# Patient Record
Sex: FEMALE | Race: OTHER | Employment: UNEMPLOYED | URBAN - METROPOLITAN AREA
[De-identification: names, ages, dates, MRNs, and addresses within clinical notes are randomized per-mention and may not be internally consistent; named-entity substitution may affect disease eponyms.]

---

## 2022-12-02 PROCEDURE — 93010 ELECTROCARDIOGRAM REPORT: CPT | Performed by: EMERGENCY MEDICINE

## 2022-12-02 PROCEDURE — 93005 ELECTROCARDIOGRAM TRACING: CPT

## 2022-12-02 PROCEDURE — 99285 EMERGENCY DEPT VISIT HI MDM: CPT

## 2022-12-02 PROCEDURE — 99284 EMERGENCY DEPT VISIT MOD MDM: CPT

## 2022-12-02 PROCEDURE — 36415 COLL VENOUS BLD VENIPUNCTURE: CPT

## 2022-12-02 RX ORDER — ERGOCALCIFEROL 1.25 MG/1
CAPSULE ORAL
COMMUNITY

## 2022-12-03 ENCOUNTER — HOSPITAL ENCOUNTER (EMERGENCY)
Facility: HOSPITAL | Age: 37
Discharge: HOME | End: 2022-12-03
Attending: EMERGENCY MEDICINE
Payer: MEDICAID

## 2022-12-03 ENCOUNTER — APPOINTMENT (OUTPATIENT)
Dept: GENERAL RADIOLOGY | Facility: HOSPITAL | Age: 37
End: 2022-12-03
Attending: EMERGENCY MEDICINE
Payer: MEDICAID

## 2022-12-03 VITALS
TEMPERATURE: 100 F | HEART RATE: 84 BPM | WEIGHT: 228 LBS | OXYGEN SATURATION: 100 % | BODY MASS INDEX: 40.4 KG/M2 | SYSTOLIC BLOOD PRESSURE: 132 MMHG | RESPIRATION RATE: 18 BRPM | HEIGHT: 63 IN | DIASTOLIC BLOOD PRESSURE: 70 MMHG

## 2022-12-03 DIAGNOSIS — J11.1 INFLUENZA: Primary | ICD-10-CM

## 2022-12-03 LAB
ANION GAP SERPL CALC-SCNC: 5 MMOL/L (ref 0–18)
ATRIAL RATE: 82 BPM
ATRIAL RATE: 86 BPM
B-HCG UR QL: NEGATIVE
BASOPHILS # BLD AUTO: 0.03 X10(3) UL (ref 0–0.2)
BASOPHILS NFR BLD AUTO: 0.3 %
BUN BLD-MCNC: 14 MG/DL (ref 7–18)
BUN/CREAT SERPL: 20 (ref 10–20)
CALCIUM BLD-MCNC: 9.2 MG/DL (ref 8.5–10.1)
CHLORIDE SERPL-SCNC: 107 MMOL/L (ref 98–112)
CO2 SERPL-SCNC: 26 MMOL/L (ref 21–32)
CREAT BLD-MCNC: 0.7 MG/DL
DEPRECATED RDW RBC AUTO: 49.3 FL (ref 35.1–46.3)
EOSINOPHIL # BLD AUTO: 0.23 X10(3) UL (ref 0–0.7)
EOSINOPHIL NFR BLD AUTO: 2.5 %
ERYTHROCYTE [DISTWIDTH] IN BLOOD BY AUTOMATED COUNT: 15.8 % (ref 11–15)
FLUAV + FLUBV RNA SPEC NAA+PROBE: NEGATIVE
FLUAV + FLUBV RNA SPEC NAA+PROBE: POSITIVE
GFR SERPLBLD BASED ON 1.73 SQ M-ARVRAT: 113 ML/MIN/1.73M2 (ref 60–?)
GLUCOSE BLD-MCNC: 100 MG/DL (ref 70–99)
HCT VFR BLD AUTO: 37.6 %
HGB BLD-MCNC: 11.6 G/DL
IMM GRANULOCYTES # BLD AUTO: 0.04 X10(3) UL (ref 0–1)
IMM GRANULOCYTES NFR BLD: 0.4 %
LYMPHOCYTES # BLD AUTO: 2.23 X10(3) UL (ref 1–4)
LYMPHOCYTES NFR BLD AUTO: 24.6 %
MCH RBC QN AUTO: 26.5 PG (ref 26–34)
MCHC RBC AUTO-ENTMCNC: 30.9 G/DL (ref 31–37)
MCV RBC AUTO: 85.8 FL
MONOCYTES # BLD AUTO: 1.13 X10(3) UL (ref 0.1–1)
MONOCYTES NFR BLD AUTO: 12.4 %
NEUTROPHILS # BLD AUTO: 5.42 X10 (3) UL (ref 1.5–7.7)
NEUTROPHILS # BLD AUTO: 5.42 X10(3) UL (ref 1.5–7.7)
NEUTROPHILS NFR BLD AUTO: 59.8 %
OSMOLALITY SERPL CALC.SUM OF ELEC: 287 MOSM/KG (ref 275–295)
P AXIS: 5 DEGREES
P AXIS: 51 DEGREES
P-R INTERVAL: 120 MS
P-R INTERVAL: 138 MS
PLATELET # BLD AUTO: 319 10(3)UL (ref 150–450)
POTASSIUM SERPL-SCNC: 4.1 MMOL/L (ref 3.5–5.1)
Q-T INTERVAL: 362 MS
Q-T INTERVAL: 372 MS
QRS DURATION: 78 MS
QRS DURATION: 80 MS
QTC CALCULATION (BEZET): 433 MS
QTC CALCULATION (BEZET): 434 MS
R AXIS: 21 DEGREES
R AXIS: 35 DEGREES
RBC # BLD AUTO: 4.38 X10(6)UL
RSV RNA SPEC NAA+PROBE: NEGATIVE
SARS-COV-2 RNA RESP QL NAA+PROBE: NOT DETECTED
SODIUM SERPL-SCNC: 138 MMOL/L (ref 136–145)
T AXIS: 35 DEGREES
T AXIS: 46 DEGREES
TROPONIN I HIGH SENSITIVITY: 4 NG/L
VENTRICULAR RATE: 82 BPM
VENTRICULAR RATE: 86 BPM
WBC # BLD AUTO: 9.1 X10(3) UL (ref 4–11)

## 2022-12-03 PROCEDURE — 93010 ELECTROCARDIOGRAM REPORT: CPT | Performed by: EMERGENCY MEDICINE

## 2022-12-03 PROCEDURE — 84484 ASSAY OF TROPONIN QUANT: CPT | Performed by: EMERGENCY MEDICINE

## 2022-12-03 PROCEDURE — 71045 X-RAY EXAM CHEST 1 VIEW: CPT | Performed by: EMERGENCY MEDICINE

## 2022-12-03 PROCEDURE — 81025 URINE PREGNANCY TEST: CPT

## 2022-12-03 PROCEDURE — 80048 BASIC METABOLIC PNL TOTAL CA: CPT | Performed by: EMERGENCY MEDICINE

## 2022-12-03 PROCEDURE — 85025 COMPLETE CBC W/AUTO DIFF WBC: CPT | Performed by: EMERGENCY MEDICINE

## 2022-12-03 PROCEDURE — 93005 ELECTROCARDIOGRAM TRACING: CPT

## 2022-12-03 PROCEDURE — 0241U SARS-COV-2/FLU A AND B/RSV BY PCR (GENEXPERT): CPT | Performed by: EMERGENCY MEDICINE

## 2022-12-03 RX ORDER — IBUPROFEN 600 MG/1
600 TABLET ORAL ONCE
Status: COMPLETED | OUTPATIENT
Start: 2022-12-03 | End: 2022-12-03

## 2022-12-03 NOTE — DISCHARGE INSTRUCTIONS
As discussed, you have a viral illness. Unfortunately there are no specific medications we can give you to make the illness and faster. Antibiotics do not work for viral illnesses. However you can take acetaminophen or ibuprofen to help with fevers and pain. Stay well-hydrated and rested. Return to the emergency department if your fevers and chills continue to worsen after 5 days, if you develop worsening cough with thick sputum, or unable to stay well-hydrated. Contact your primary care provider in the next few days for reevaluation and to make sure your symptoms are improving.

## 2022-12-03 NOTE — ED INITIAL ASSESSMENT (HPI)
Pt c/o fever, muscles aches x 2 days. Pt took advil last night. Chest pain and neck pain and cough and headache. Pt has hx of stroke.

## 2023-03-14 ENCOUNTER — OFFICE VISIT (OUTPATIENT)
Dept: OBGYN CLINIC | Facility: CLINIC | Age: 38
End: 2023-03-14
Payer: MEDICAID

## 2023-03-14 VITALS
SYSTOLIC BLOOD PRESSURE: 130 MMHG | DIASTOLIC BLOOD PRESSURE: 84 MMHG | HEIGHT: 63 IN | BODY MASS INDEX: 41.29 KG/M2 | WEIGHT: 233 LBS

## 2023-03-14 DIAGNOSIS — N39.3 URINARY, INCONTINENCE, STRESS FEMALE: ICD-10-CM

## 2023-03-14 DIAGNOSIS — Z01.419 ENCOUNTER FOR GYNECOLOGICAL EXAMINATION WITHOUT ABNORMAL FINDING: Primary | ICD-10-CM

## 2023-03-14 DIAGNOSIS — Z12.31 ENCOUNTER FOR SCREENING MAMMOGRAM FOR MALIGNANT NEOPLASM OF BREAST: ICD-10-CM

## 2023-03-14 PROCEDURE — 3079F DIAST BP 80-89 MM HG: CPT | Performed by: OBSTETRICS & GYNECOLOGY

## 2023-03-14 PROCEDURE — 99386 PREV VISIT NEW AGE 40-64: CPT | Performed by: OBSTETRICS & GYNECOLOGY

## 2023-03-14 PROCEDURE — 87624 HPV HI-RISK TYP POOLED RSLT: CPT | Performed by: OBSTETRICS & GYNECOLOGY

## 2023-03-14 PROCEDURE — 3075F SYST BP GE 130 - 139MM HG: CPT | Performed by: OBSTETRICS & GYNECOLOGY

## 2023-03-14 PROCEDURE — 3008F BODY MASS INDEX DOCD: CPT | Performed by: OBSTETRICS & GYNECOLOGY

## 2023-03-14 RX ORDER — DEXTROAMPHETAMINE SACCHARATE, AMPHETAMINE ASPARTATE, DEXTROAMPHETAMINE SULFATE AND AMPHETAMINE SULFATE 5; 5; 5; 5 MG/1; MG/1; MG/1; MG/1
1 TABLET ORAL EVERY MORNING
COMMUNITY
Start: 2023-02-21

## 2023-03-14 RX ORDER — AMOXICILLIN 500 MG
CAPSULE ORAL
COMMUNITY

## 2023-03-14 RX ORDER — MULTIVITAMIN WITH IRON
250 TABLET ORAL
COMMUNITY

## 2023-03-15 LAB — HPV I/H RISK 1 DNA SPEC QL NAA+PROBE: NEGATIVE

## 2023-03-21 ENCOUNTER — HOSPITAL ENCOUNTER (OUTPATIENT)
Dept: MAMMOGRAPHY | Facility: HOSPITAL | Age: 38
Discharge: HOME | End: 2023-03-21
Attending: OBSTETRICS & GYNECOLOGY
Payer: MEDICAID

## 2023-03-21 ENCOUNTER — OFFICE VISIT (OUTPATIENT)
Dept: INTERNAL MEDICINE CLINIC | Facility: CLINIC | Age: 38
End: 2023-03-21

## 2023-03-21 VITALS
RESPIRATION RATE: 18 BRPM | DIASTOLIC BLOOD PRESSURE: 71 MMHG | BODY MASS INDEX: 41 KG/M2 | HEART RATE: 79 BPM | WEIGHT: 234 LBS | SYSTOLIC BLOOD PRESSURE: 132 MMHG

## 2023-03-21 DIAGNOSIS — G43.009 MIGRAINE WITHOUT AURA AND WITHOUT STATUS MIGRAINOSUS, NOT INTRACTABLE: Primary | ICD-10-CM

## 2023-03-21 DIAGNOSIS — F98.8 ATTENTION DEFICIT DISORDER, UNSPECIFIED HYPERACTIVITY PRESENCE: ICD-10-CM

## 2023-03-21 DIAGNOSIS — H53.9 VISION CHANGES: ICD-10-CM

## 2023-03-21 DIAGNOSIS — M72.2 PLANTAR FASCIITIS: ICD-10-CM

## 2023-03-21 DIAGNOSIS — Z12.31 ENCOUNTER FOR SCREENING MAMMOGRAM FOR MALIGNANT NEOPLASM OF BREAST: ICD-10-CM

## 2023-03-21 PROCEDURE — 77067 SCR MAMMO BI INCL CAD: CPT | Performed by: OBSTETRICS & GYNECOLOGY

## 2023-03-21 PROCEDURE — 3075F SYST BP GE 130 - 139MM HG: CPT | Performed by: INTERNAL MEDICINE

## 2023-03-21 PROCEDURE — 77063 BREAST TOMOSYNTHESIS BI: CPT | Performed by: OBSTETRICS & GYNECOLOGY

## 2023-03-21 PROCEDURE — 3078F DIAST BP <80 MM HG: CPT | Performed by: INTERNAL MEDICINE

## 2023-03-21 PROCEDURE — 99214 OFFICE O/P EST MOD 30 MIN: CPT | Performed by: INTERNAL MEDICINE

## 2023-03-21 RX ORDER — SUMATRIPTAN 50 MG/1
50 TABLET, FILM COATED ORAL EVERY 2 HOUR PRN
Qty: 9 TABLET | Refills: 1 | Status: SHIPPED | OUTPATIENT
Start: 2023-03-21

## 2023-05-01 ENCOUNTER — OFFICE VISIT (OUTPATIENT)
Dept: PODIATRY CLINIC | Facility: CLINIC | Age: 38
End: 2023-05-01

## 2023-05-01 ENCOUNTER — HOSPITAL ENCOUNTER (OUTPATIENT)
Dept: GENERAL RADIOLOGY | Facility: HOSPITAL | Age: 38
Discharge: HOME | End: 2023-05-01
Attending: PODIATRIST
Payer: MEDICAID

## 2023-05-01 DIAGNOSIS — M72.2 PLANTAR FASCIITIS OF LEFT FOOT: ICD-10-CM

## 2023-05-01 DIAGNOSIS — M21.6X2 ACQUIRED EQUINUS DEFORMITY OF LEFT FOOT: ICD-10-CM

## 2023-05-01 DIAGNOSIS — S93.402S MODERATE LEFT ANKLE SPRAIN, SEQUELA: ICD-10-CM

## 2023-05-01 DIAGNOSIS — S93.402S MODERATE LEFT ANKLE SPRAIN, SEQUELA: Primary | ICD-10-CM

## 2023-05-01 DIAGNOSIS — M25.572 CHRONIC PAIN OF LEFT ANKLE: ICD-10-CM

## 2023-05-01 DIAGNOSIS — G89.29 CHRONIC PAIN OF LEFT ANKLE: ICD-10-CM

## 2023-05-01 PROCEDURE — 73610 X-RAY EXAM OF ANKLE: CPT | Performed by: PODIATRIST

## 2023-05-01 RX ORDER — METHYLPREDNISOLONE 4 MG/1
TABLET ORAL
Qty: 1 EACH | Refills: 0 | Status: SHIPPED | OUTPATIENT
Start: 2023-05-01

## 2023-05-25 ENCOUNTER — OFFICE VISIT (OUTPATIENT)
Dept: NEUROLOGY | Facility: CLINIC | Age: 38
End: 2023-05-25
Payer: MEDICAID

## 2023-05-25 VITALS — BODY MASS INDEX: 41.46 KG/M2 | WEIGHT: 234 LBS | HEIGHT: 63 IN

## 2023-05-25 DIAGNOSIS — H93.13 TINNITUS OF BOTH EARS: ICD-10-CM

## 2023-05-25 DIAGNOSIS — G43.009 MIGRAINE WITHOUT AURA AND WITHOUT STATUS MIGRAINOSUS, NOT INTRACTABLE: Primary | ICD-10-CM

## 2023-05-25 DIAGNOSIS — E23.6 EMPTY SELLA (HCC): ICD-10-CM

## 2023-05-25 PROCEDURE — 99204 OFFICE O/P NEW MOD 45 MIN: CPT | Performed by: OTHER

## 2023-05-25 PROCEDURE — 3008F BODY MASS INDEX DOCD: CPT | Performed by: OTHER

## 2023-05-25 RX ORDER — RIZATRIPTAN BENZOATE 10 MG/1
TABLET, ORALLY DISINTEGRATING ORAL
Qty: 12 TABLET | Refills: 0 | Status: SHIPPED | OUTPATIENT
Start: 2023-05-25

## 2023-05-25 RX ORDER — TOPIRAMATE 25 MG/1
TABLET ORAL
Qty: 90 TABLET | Refills: 5 | Status: SHIPPED | OUTPATIENT
Start: 2023-05-25

## 2023-09-27 ENCOUNTER — APPOINTMENT (OUTPATIENT)
Dept: MRI IMAGING | Facility: HOSPITAL | Age: 38
End: 2023-09-27
Attending: NURSE PRACTITIONER
Payer: MEDICAID

## 2023-09-27 ENCOUNTER — HOSPITAL ENCOUNTER (EMERGENCY)
Facility: HOSPITAL | Age: 38
Discharge: HOME | End: 2023-09-27
Payer: MEDICAID

## 2023-09-27 VITALS
OXYGEN SATURATION: 99 % | HEIGHT: 63 IN | WEIGHT: 233 LBS | TEMPERATURE: 99 F | DIASTOLIC BLOOD PRESSURE: 55 MMHG | HEART RATE: 67 BPM | BODY MASS INDEX: 41.29 KG/M2 | RESPIRATION RATE: 18 BRPM | SYSTOLIC BLOOD PRESSURE: 115 MMHG

## 2023-09-27 DIAGNOSIS — M54.16 LUMBAR RADICULOPATHY: ICD-10-CM

## 2023-09-27 DIAGNOSIS — M51.26 LUMBAR HERNIATED DISC: Primary | ICD-10-CM

## 2023-09-27 PROCEDURE — 99284 EMERGENCY DEPT VISIT MOD MDM: CPT

## 2023-09-27 PROCEDURE — 99285 EMERGENCY DEPT VISIT HI MDM: CPT

## 2023-09-27 PROCEDURE — 72148 MRI LUMBAR SPINE W/O DYE: CPT | Performed by: NURSE PRACTITIONER

## 2023-09-27 PROCEDURE — 96372 THER/PROPH/DIAG INJ SC/IM: CPT

## 2023-09-27 RX ORDER — METHYLPREDNISOLONE 4 MG/1
TABLET ORAL
Qty: 1 EACH | Refills: 0 | Status: SHIPPED | OUTPATIENT
Start: 2023-09-27

## 2023-09-27 RX ORDER — CYCLOBENZAPRINE HCL 10 MG
10 TABLET ORAL 3 TIMES DAILY PRN
Qty: 20 TABLET | Refills: 0 | Status: SHIPPED | OUTPATIENT
Start: 2023-09-27 | End: 2023-10-04

## 2023-09-27 RX ORDER — KETOROLAC TROMETHAMINE 15 MG/ML
30 INJECTION, SOLUTION INTRAMUSCULAR; INTRAVENOUS ONCE
Status: COMPLETED | OUTPATIENT
Start: 2023-09-27 | End: 2023-09-27

## 2023-09-27 RX ORDER — CYCLOBENZAPRINE HCL 5 MG
10 TABLET ORAL ONCE
Status: COMPLETED | OUTPATIENT
Start: 2023-09-27 | End: 2023-09-27

## 2023-09-27 RX ORDER — LIDOCAINE 50 MG/G
1 PATCH TOPICAL EVERY 24 HOURS
Qty: 5 EACH | Refills: 0 | Status: SHIPPED | OUTPATIENT
Start: 2023-09-27

## 2023-09-27 NOTE — ED INITIAL ASSESSMENT (HPI)
33 y/o female arrives with back pain x 4 days. Her pcp referred her to the ED. Pt reports tingling to bilateral LE, and x 1 episode of urinary incontinence today. Pt denies LE weakness.

## 2023-09-27 NOTE — ED QUICK NOTES
Patient reminded of need for urine specimen. Patient verbalizes understanding and denies need to urinate at this time.

## 2023-09-28 NOTE — ED QUICK NOTES
Per provider patient ok to discharge. Discharge instructions/medications reviewed with patient. Patient verbalizes understanding. Patient in NAD, ABCs intact. Patient stable and ambulatory at discharge. Patient left department with all belongings.

## 2023-09-28 NOTE — DISCHARGE INSTRUCTIONS
Tylenol/ibuprofen as needed for pain  Ice to your sore areas   Do not drink or drive if taking muscle relaxer  Follow-up with your primary care doctor in 2-3 days  Make appointment with the specialist  Return to the emergency department for any new or worsening symptoms at any time

## 2023-10-02 ENCOUNTER — OFFICE VISIT (OUTPATIENT)
Dept: SURGERY | Facility: CLINIC | Age: 38
End: 2023-10-02
Payer: MEDICAID

## 2023-10-02 VITALS
BODY MASS INDEX: 41.29 KG/M2 | HEART RATE: 73 BPM | DIASTOLIC BLOOD PRESSURE: 60 MMHG | HEIGHT: 63 IN | WEIGHT: 233 LBS | SYSTOLIC BLOOD PRESSURE: 110 MMHG | OXYGEN SATURATION: 97 %

## 2023-10-02 DIAGNOSIS — G93.5 CHIARI MALFORMATION TYPE I (HCC): ICD-10-CM

## 2023-10-02 DIAGNOSIS — R68.89 COLD INTOLERANCE: ICD-10-CM

## 2023-10-02 DIAGNOSIS — J34.89 NASAL DRAINAGE: ICD-10-CM

## 2023-10-02 DIAGNOSIS — L60.3 BRITTLE NAILS: ICD-10-CM

## 2023-10-02 DIAGNOSIS — M51.36 ANNULAR TEAR OF LUMBAR DISC: ICD-10-CM

## 2023-10-02 DIAGNOSIS — M54.50 LUMBAR PAIN: Primary | ICD-10-CM

## 2023-10-02 DIAGNOSIS — L65.9 HAIR LOSS: ICD-10-CM

## 2023-10-02 DIAGNOSIS — S39.012A STRAIN OF LUMBAR REGION, INITIAL ENCOUNTER: ICD-10-CM

## 2023-10-02 DIAGNOSIS — R51.9 FREQUENT HEADACHES: ICD-10-CM

## 2023-10-02 DIAGNOSIS — E66.3 PATIENT OVERWEIGHT: ICD-10-CM

## 2023-10-02 DIAGNOSIS — M54.2 NECK PAIN: ICD-10-CM

## 2023-10-02 PROBLEM — F98.8 ATTENTION DEFICIT DISORDER (ADD) IN ADULT: Status: ACTIVE | Noted: 2022-08-29

## 2023-10-02 PROBLEM — E66.01 SEVERE OBESITY (BMI >= 40) (HCC): Status: ACTIVE | Noted: 2023-08-17

## 2023-10-02 PROBLEM — G81.94 LEFT HEMIPARESIS (HCC): Status: ACTIVE | Noted: 2021-03-11

## 2023-10-02 PROBLEM — M54.41 CHRONIC BILATERAL LOW BACK PAIN WITH BILATERAL SCIATICA: Status: ACTIVE | Noted: 2021-12-08

## 2023-10-02 PROBLEM — E78.1 ABNORMALLY LOW HIGH DENSITY LIPOPROTEIN (HDL) CHOLESTEROL WITH HYPERTRIGLYCERIDEMIA: Status: ACTIVE | Noted: 2021-12-08

## 2023-10-02 PROBLEM — N39.3 STRESS INCONTINENCE: Status: ACTIVE | Noted: 2022-05-12

## 2023-10-02 PROBLEM — E78.6 ABNORMALLY LOW HIGH DENSITY LIPOPROTEIN (HDL) CHOLESTEROL WITH HYPERTRIGLYCERIDEMIA: Status: ACTIVE | Noted: 2021-12-08

## 2023-10-02 PROBLEM — K21.9 GASTROESOPHAGEAL REFLUX DISEASE WITHOUT ESOPHAGITIS: Status: ACTIVE | Noted: 2023-05-15

## 2023-10-02 PROBLEM — M54.42 CHRONIC BILATERAL LOW BACK PAIN WITH BILATERAL SCIATICA: Status: ACTIVE | Noted: 2021-12-08

## 2023-10-02 PROBLEM — G89.29 CHRONIC BILATERAL LOW BACK PAIN WITH BILATERAL SCIATICA: Status: ACTIVE | Noted: 2021-12-08

## 2023-10-02 PROCEDURE — 3078F DIAST BP <80 MM HG: CPT | Performed by: PHYSICIAN ASSISTANT

## 2023-10-02 PROCEDURE — 99204 OFFICE O/P NEW MOD 45 MIN: CPT | Performed by: PHYSICIAN ASSISTANT

## 2023-10-02 PROCEDURE — 3008F BODY MASS INDEX DOCD: CPT | Performed by: PHYSICIAN ASSISTANT

## 2023-10-02 PROCEDURE — 3074F SYST BP LT 130 MM HG: CPT | Performed by: PHYSICIAN ASSISTANT

## 2023-10-02 RX ORDER — GABAPENTIN 300 MG/1
300 CAPSULE ORAL EVERY EVENING
COMMUNITY
Start: 2023-05-03

## 2023-10-02 NOTE — PROGRESS NOTES
New patient:  Reason for visit: lower back pain     Estimated time of onset:  week(s)  9.24.23    Numeric Rating Scale: (No Pain) 0  to  10 (Worst Pain)        Pain at Present:  5/10                                                                                                                       Distribution of Pain:    bilateral N/T in MP arms, states she has tingling in MP legs     Past Treatments for Current Pain Condition:   NSAIDS, Gabapentin, Medrol pack helping her   Response to treatment: no relief    Prior diagnostic testing related to this condition:      MRI lumbar spine 9.27.23

## 2023-10-02 NOTE — H&P
Patient: Johnny Salazar  Medical Record Number: [id number]  YOB: 1985  PCP: Earlene Gordillo    Reason for visit: Patient presents with:  New Patient  Low Back Pain      Thank you very much for requesting this consultation. I had the opportunity to evaluate and initiate care for your patient today, as per your request.    HISTORY OF CHIEF COMPLAINT:    Johnny Salazar is a very pleasant 34year old female, with a pertinent PMH of hypertriglyceridemia, migraines, left hemiparesis, ADHD, GERD, chronic low back pain with bilateral sciatica, stress incontinence, obesity  Presents today for a complaint of: Patient presents with:  New Patient  Low Back Pain  The patient was recently at the ED on 9/27/2023 for back pain and tingling in the legs. She states she had incontinence secondary to the pain. MRI imaging was completed. The patient was provided on discharge a lidocaine patch, Medrol Dosepak and Flexeril. The patient is feeling significantly improved with medication therapy. She typically has 1-2 flares of back pain per year. Currently she has no radiating lower extremity pain, numbness, tingling or weakness. No urinary incontinence, although she is does have leakage. History of a sling placement. No recent pelvic floor therapy. Her back pain is much improved. Location is mid low back. This flare feels similar to flares prior. No recent conservative treatment such as physical therapy. The patient endorses she has been working on losing weight to assist with her back pain. She has difficulty with weight loss, even after cutting certain foods out of her diet. She notes hair loss and brittle nails. Cold intolerance. She is concerned she has a thyroid problem. Patient endorses a history of a Chiari malformation. She has seen ophthalmology prior given concerns with the Chiari. She endorses frequent headaches. She endorses auras. She does get neck pain and base of the skull pain.   Headaches occasionally worse with coughing and sneezing. She endorses clear nasal drainage during her headaches, worse with flexing forward. This resolves after her headaches. No gait instability. No shooting arm pain, numbness, tingling or weakness. Past Medical History:   Diagnosis Date    ADHD     Essential hypertension     Heart murmur     Hyperlipidemia     Spinal stenosis     TIA (transient ischemic attack)     Urinary incontinence       Past Surgical History:   Procedure Laterality Date    APPENDECTOMY            X2    CHOLECYSTECTOMY      EVALUATE ONLY, BLADDER (DMG)      MIDURETHRAL SLING  2022    TVT @ [facility]    OTHER SURGICAL HISTORY      Gallbladder removal & 2 hernia repairs    TUBAL LIGATION      Yr       Family History   Problem Relation Age of Onset    Hypertension Mother     Other (cervical cancer) Mother     Diabetes Father     Hypertension Father       Social History    Socioeconomic History      Marital status:     Occupational History      Occupation: [employer]    Tobacco Use      Smoking status: Never      Smokeless tobacco: Never    Vaping Use      Vaping Use: Never used    Substance and Sexual Activity      Alcohol use: Yes        Comment: Rarely      Drug use: Never      Sexual activity: Yes        Partners: Male        Birth control/protection: Tubal Ligation    Other Topics      Concerns:        Caffeine Concern: No        Exercise: No        Seat Belt: Yes        Special Diet: No        Stress Concern: Yes        Weight Concern: Yes        Blood Transfusions: No       Cephalexin              HIVES  Morphine                NAUSEA AND VOMITING, ANGIOEDEMA,                            Tightness in Chest    Comment:Very anxious cannot catch breath  Sumatriptan             OTHER (SEE COMMENTS)   Current Medications:  Current Outpatient Medications   Medication Sig Dispense Refill    gabapentin 300 MG Oral Cap Take 1 capsule (300 mg total) by mouth every evening.       lidocaine 5 % External Patch Place 1 patch onto the skin daily. 5 each 0    methylPREDNISolone (MEDROL) 4 MG Oral Tablet Therapy Pack Dosepack: take as directed 1 each 0    cyclobenzaprine 10 MG Oral Tab Take 1 tablet (10 mg total) by mouth 3 (three) times daily as needed for Muscle spasms. 20 tablet 0    topiramate 25 MG Oral Tab Start with 1 pill QHS, for 1 week, then 2 pills qhs foor another week, then 3 pills qhs 90 tablet 5    rizatriptan 10 MG Oral Tablet Dispersible use at onset; may repeat once after 2 hours- ONLY 2 IN 24 HOUR PERIOD MAX. This is a 30 day supply. 12 tablet 0    Cholecalciferol 50 MCG (2000 UT) Oral Cap Take 2,000 Units by mouth daily. magnesium 250 MG Oral Tab Take 1 tablet (250 mg total) by mouth. Omega-3 Fatty Acids (FISH OIL) 1200 MG Oral Cap Take by mouth. amphetamine-dextroamphetamine 20 MG Oral Tab Take 1 tablet by mouth every morning. REVIEW OF SYSTEMS   Comprehensive review of systems done. Negative except what is outlined in the above HPI. PHYSICAL EXAMIMATION    height is 63\" and weight is 233 lb (105.7 kg). Her blood pressure is 110/60 and her pulse is 73. Her oxygen saturation is 97%. GENERAL: Very pleasant patient is in no apparent distress. Sitting comfortably in the examination chair. HEENT: Normocephalic, atraumatic. RESPIRATORY RATE: Easy and Even  SKIN: Warm and dry  NEURO: Awake, alert and orientated. Speech fluent, comprehension intact, answering questions appropriately. SPINE:  Gait/Coordination: Gait deferred   Palpation: Non tender to palpation of midline cervical spine.  + tenderness over L4, L5 and S1    Upper Extremity Strength:    Deltoid  Biceps  Triceps     Intrinsics      Right 5 5 5 5 5     Left 5 5 5 5 5   Lower Extremity Strength:     Iliopsoas  Hamstrings   Quads    D-flexion P-flexion Great Toe   Right       5         5       5         5 5 5   Left       5         5       5         5 5 5     Tests:   Test Right   (POS or NEG) Left   (POS or NEG)   Alarcon's Sign Neg Neg   SLR Neg Neg   Clonus Neg Neg     DATA:   None    IMAGING:   Study Result    Narrative   PROCEDURE: MRI SPINE LUMBAR (CPT=72148)     COMPARISON: None. INDICATIONS: Radicular symptoms. TECHNIQUE: A variety of imaging planes and parameters were utilized for visualization of suspected pathology. FINDINGS:  PARASPINAL AREA: Increased fluid signal within the interspinous ligament at L4-L5. Anteverted uterus with  section scar and nabothian cysts in the cervix. BONES: Focal fatty infiltration versus hemangioma in the T12 and L1 vertebral bodies. Modic endplate reactive change above and below the L4-5 disc. Otherwise grossly normal vertebral body height, alignment, and marrow signal.  CORD/CAUDA EQUINA: Conus terminates at L1 pedicle level. The visualized cord, conus, and cauda equina show normal signal and morphology. OTHER: Negative. LUMBAR DISC LEVELS:  L1-L2: No significant disc/facet abnormality, spinal stenosis, or foraminal stenosis. L2-L3: No significant disc/facet abnormality, spinal stenosis, or foraminal stenosis. L3-L4: No significant disc/facet abnormality, spinal stenosis, or foraminal stenosis. L4-L5: Disc desiccation. Broad-based disc bulge extending into the lateral recesses. Linear high intensity zone in the inferior aspect the disc compatible with annular tear. This finding causes mild narrowing central canal and mild bilateral neural  foraminal narrowing. The disc bulge also potentially contacts the transitioning bilateral L5 nerve roots. L5-S1: No significant disc/facet abnormality, spinal stenosis, or foraminal stenosis. Impression   CONCLUSION:  1. Broad-based disc bulge at L4-5. Annular tear is also in this disc. At this level there is mild central canal and mild bilateral neural foraminal narrowing.   Disc bulge may also potentially contact the transitioning bilateral L5 nerve roots and  therefore correlate for bilateral L4 and L5 radiculopathies. 2.  Low-grade sprain of the interspinous ligament at L4-L5. Dictated by (CST): Romina De Leon MD on 9/27/2023 at 7:09 PM      Finalized by (CST): Romina De Leon MD on 9/27/2023 at 7:16 PM       MEDICAL DECISION MAKING:     ASSESSMENT and PLAN:  (M54.50) Lumbar pain  (primary encounter diagnosis)    (S39.012A) Strain of lumbar region, initial encounter    (E66.3) Patient overweight    (L60.3) Brittle nails    (L65.9) Hair loss    (R68.89) Cold intolerance    (M51.36) Annular tear of lumbar disc    (J34.89) Clear nasal drainage during headaches    (G93.5) Chiari malformation type I (HCC)    (M54.2) Neck pain    (R51.9) Frequent headaches    PLAN:   1. Medication: None prescribed   - Recommend OTC NSAID therapy PRN  2. Imaging:    - Reviewed today:    - MRI lumbar spine:     - Lumbar DDD most prominent at L4-5 with central disc bulge and annular tear with possible abutment of the bilateral L5 nerve roots. - Ordered today:    - MRI brain and cervical:     -Further assess history of Chiari I malformation with frequent headaches and clear nasal discharge during headaches as well as neck pain  3. Referral:    - Integrative medicine clinic:    -Referred for assessment of brittle nails, cold intolerance and hair loss   -Physiatry:    -Further conservative treatment of low back   -Weight loss clinic  4. Follow up with Dr. Destini Smalls following MRI brain and cervical imaging or call or follow up sooner or go to the ED for any new, worsening or concerning signs or symptoms     Dr. Jean-Paul Menjivar and I reviewed imaging and discussed the plan. Dr. Jean-Paul Menjivar agrees with the plan. Dr. Jean-Paul Menjivar and RICK reviewed imaging and discussed the plan with the patient. The patient agrees with the plan, verbalized understanding and is appreciative. All questions were sought out and thoroughly answered to satisfaction.        Total visit time: 50 min  More than 50% spent coordinating care, providing patient education, reviewing imaging, discussing further imaging, discussing weight loss clinic, discussing integrative medicine clinic, discussing physiatry and counseling. Thank you very much for the kind referral.  Respectfully yours,    Fadia Decker M.S., EILEEN CORTES [address] [place]  [place], [address] [facility]  [phone]  10/2/2023 11:01 AM    Dragon speech recognition software was used to prepare this note. If a word or phrase is confusing, it is likely due to a failure of recognition. Please contact me with any questions or clarifications.

## 2023-10-02 NOTE — PROGRESS NOTES
Neurosurgery staff    Patient seen and examined. Please also see SEAN Lopez note for further information. History reviewed in detail. MRI scan reviewed personally. Mild spondylosis without evidence of severe central stenosis. We spent some time discussing her headaches. These do appear to have a tussive quality. She also notes intermittent fluid drainage from her nose, when she has headaches. This most recently happened about 3 months ago. Obtain MRI scan of the brain and cervical spine to evaluate reported Chiari I malformation. Patient will follow-up with my partner, Dr. Navneet Hernandez, once this is complete.

## 2023-10-27 ENCOUNTER — TELEPHONE (OUTPATIENT)
Dept: SURGERY | Facility: CLINIC | Age: 38
End: 2023-10-27

## 2023-10-27 ENCOUNTER — PATIENT MESSAGE (OUTPATIENT)
Dept: SURGERY | Facility: CLINIC | Age: 38
End: 2023-10-27

## 2023-10-27 ENCOUNTER — HOSPITAL ENCOUNTER (OUTPATIENT)
Dept: MRI IMAGING | Age: 38
Discharge: HOME | End: 2023-10-27
Attending: PHYSICIAN ASSISTANT

## 2023-10-27 DIAGNOSIS — G93.5 CHIARI MALFORMATION TYPE I (HCC): ICD-10-CM

## 2023-10-27 DIAGNOSIS — J34.89 NASAL DRAINAGE: ICD-10-CM

## 2023-10-27 DIAGNOSIS — R51.9 FREQUENT HEADACHES: ICD-10-CM

## 2023-10-27 DIAGNOSIS — M54.2 NECK PAIN: ICD-10-CM

## 2023-10-27 PROCEDURE — 70551 MRI BRAIN STEM W/O DYE: CPT | Performed by: PHYSICIAN ASSISTANT

## 2023-10-27 NOTE — TELEPHONE ENCOUNTER
Pt calling on her way to 11am MRI at [facility] [address] Location and states is claustrophobic and will need medication. Psr informed pt that psr will request nurse to call to see if possible due to timing. Please call to advise as pt states will need to r/s if unable to get medication.

## 2023-10-27 NOTE — TELEPHONE ENCOUNTER
Called patient and left a message that we need more time to get a prescription ordered  for her. She is at the appt now. If she cannot complete the MRI she should reschedule it and call us back to get premedication. I left message that she would need a  if she is going to be premedicated. To call us if needs anything further.

## 2023-10-30 ENCOUNTER — PATIENT MESSAGE (OUTPATIENT)
Dept: OBGYN CLINIC | Facility: CLINIC | Age: 38
End: 2023-10-30

## 2023-10-30 NOTE — TELEPHONE ENCOUNTER
Noted that patient has messaged, requesting feedback on MRI. Patient completed MRI Brain on 10.27.23. MRI cervical has not been completed and no future office visit is scheduled. Per SEAN Maguire at office visit on 10. 2.23:  \"PLAN:   1. Medication: None prescribed                - Recommend OTC NSAID therapy PRN  2. Imaging:                 - Reviewed today:                              - MRI lumbar spine:                                            - Lumbar DDD most prominent at L4-5 with central disc bulge and annular tear with possible abutment of the bilateral L5 nerve roots. - Ordered today:                              - MRI brain and cervical:                                            -Further assess history of Chiari I malformation with frequent headaches and clear nasal discharge during headaches as well as neck pain  3. Referral:                 - Integrative medicine clinic:                              -Referred for assessment of brittle nails, cold intolerance and hair loss                -Physiatry:                              -Further conservative treatment of low back                -Weight loss clinic  4. Follow up with Dr. Amalia Lima following MRI brain and cervical imaging or call or follow up sooner or go to the ED for any new, worsening or concerning signs or symptoms\"    Routed to SEAN Maguire.

## 2023-10-30 NOTE — TELEPHONE ENCOUNTER
Patient has replied via Muut, asking for a call back to discuss imaging despite acknowledging that she will schedule an appointment to discuss findings with Dr. Savannah Clay. Called patient to inform her of above. Patient stated that she had scheduled an appointment with SEAN Coleman on 11. 1.23 to discuss imaging results. Recommended to patient that she schedule an appointment to see Dr. Savannah Clay next. Patient stated that Dr. Savannah Clay does not have appointments until December. Recommended to patient to see DIANNE Caldwell on a date when Dr. Savannah Clay is in clinic. Patient acknowledged and stated that she would like a Muut message summarizing above discussion and thanked Nursing for the call. Muut message sent.

## 2023-10-30 NOTE — TELEPHONE ENCOUNTER
From: Nereida Ho  To: Beatriz Patricia  Sent: 10/27/2023 2:10 PM CDT  Subject: MRI Results    Hello Doctor    I just want to know if you can please explain to me the results of my MRI test. Thank you!

## 2023-10-30 NOTE — TELEPHONE ENCOUNTER
Pt cb and has been scheduled for 11/16/23 with PA to discuss imaging results pt obtaining MRI Cervical 11/10/23

## 2023-10-30 NOTE — TELEPHONE ENCOUNTER
Yes, schedule with Dr. Shaun Saldana to review imaging after MRI cervical spine is also completed. Thank you!

## 2023-11-14 ENCOUNTER — TELEMEDICINE (OUTPATIENT)
Dept: INTERNAL MEDICINE CLINIC | Facility: CLINIC | Age: 38
End: 2023-11-14
Payer: MEDICAID

## 2023-11-14 DIAGNOSIS — R06.83 SNORING: ICD-10-CM

## 2023-11-14 DIAGNOSIS — M54.41 CHRONIC BILATERAL LOW BACK PAIN WITH BILATERAL SCIATICA: ICD-10-CM

## 2023-11-14 DIAGNOSIS — Z51.81 ENCOUNTER FOR THERAPEUTIC DRUG MONITORING: Primary | ICD-10-CM

## 2023-11-14 DIAGNOSIS — E88.819 INSULIN RESISTANCE: ICD-10-CM

## 2023-11-14 DIAGNOSIS — G43.009 MIGRAINE WITHOUT AURA AND WITHOUT STATUS MIGRAINOSUS, NOT INTRACTABLE: ICD-10-CM

## 2023-11-14 DIAGNOSIS — M54.42 CHRONIC BILATERAL LOW BACK PAIN WITH BILATERAL SCIATICA: ICD-10-CM

## 2023-11-14 DIAGNOSIS — R53.82 CHRONIC FATIGUE: ICD-10-CM

## 2023-11-14 DIAGNOSIS — Z86.73 HISTORY OF TIA (TRANSIENT ISCHEMIC ATTACK): ICD-10-CM

## 2023-11-14 DIAGNOSIS — G89.29 CHRONIC BILATERAL LOW BACK PAIN WITH BILATERAL SCIATICA: ICD-10-CM

## 2023-11-14 DIAGNOSIS — E66.01 CLASS 3 SEVERE OBESITY WITH SERIOUS COMORBIDITY AND BODY MASS INDEX (BMI) OF 40.0 TO 44.9 IN ADULT, UNSPECIFIED OBESITY TYPE (HCC): ICD-10-CM

## 2023-11-14 PROBLEM — E66.813 CLASS 3 SEVERE OBESITY WITH SERIOUS COMORBIDITY AND BODY MASS INDEX (BMI) OF 40.0 TO 44.9 IN ADULT (HCC): Status: ACTIVE | Noted: 2023-11-14

## 2023-11-14 PROCEDURE — 99204 OFFICE O/P NEW MOD 45 MIN: CPT | Performed by: NURSE PRACTITIONER

## 2023-11-14 RX ORDER — METFORMIN HYDROCHLORIDE 750 MG/1
1500 TABLET, EXTENDED RELEASE ORAL
Qty: 60 TABLET | Refills: 2 | Status: SHIPPED | OUTPATIENT
Start: 2023-11-14

## 2023-11-16 ENCOUNTER — TELEPHONE (OUTPATIENT)
Dept: SURGERY | Facility: CLINIC | Age: 38
End: 2023-11-16

## 2023-11-16 NOTE — TELEPHONE ENCOUNTER
Spoke to patient. She will reschedule with Dr Nakita Dominguez and needs to bring in imaging from [facility].

## 2023-11-27 ENCOUNTER — OFFICE VISIT (OUTPATIENT)
Dept: SURGERY | Facility: CLINIC | Age: 38
End: 2023-11-27
Payer: MEDICAID

## 2023-11-27 ENCOUNTER — TELEPHONE (OUTPATIENT)
Dept: SURGERY | Facility: CLINIC | Age: 38
End: 2023-11-27

## 2023-11-27 VITALS
BODY MASS INDEX: 41.29 KG/M2 | WEIGHT: 233 LBS | HEART RATE: 70 BPM | DIASTOLIC BLOOD PRESSURE: 82 MMHG | HEIGHT: 63 IN | SYSTOLIC BLOOD PRESSURE: 112 MMHG

## 2023-11-27 DIAGNOSIS — H93.A1 PULSATILE TINNITUS OF RIGHT EAR: Primary | ICD-10-CM

## 2023-11-27 DIAGNOSIS — J34.89 RHINORRHEA: ICD-10-CM

## 2023-11-27 DIAGNOSIS — R51.9 INTRACTABLE HEADACHE, UNSPECIFIED CHRONICITY PATTERN, UNSPECIFIED HEADACHE TYPE: ICD-10-CM

## 2023-11-27 PROCEDURE — 3008F BODY MASS INDEX DOCD: CPT | Performed by: NEUROLOGICAL SURGERY

## 2023-11-27 PROCEDURE — 3074F SYST BP LT 130 MM HG: CPT | Performed by: NEUROLOGICAL SURGERY

## 2023-11-27 PROCEDURE — 99205 OFFICE O/P NEW HI 60 MIN: CPT | Performed by: NEUROLOGICAL SURGERY

## 2023-11-27 PROCEDURE — 3079F DIAST BP 80-89 MM HG: CPT | Performed by: NEUROLOGICAL SURGERY

## 2023-11-27 NOTE — TELEPHONE ENCOUNTER
Patient brought in imaging disc of MRI cervical w/o contrast and MRI head wo contrast taken on 11-4-23. Uploaded to PACS. Disc returned to patient during appt.

## 2023-12-15 ENCOUNTER — TELEPHONE (OUTPATIENT)
Dept: SURGERY | Facility: CLINIC | Age: 38
End: 2023-12-15

## 2023-12-15 NOTE — TELEPHONE ENCOUNTER
Patient came to office stating Dr Destini Smalls gave her a sterile cup to collect nasal CSF leakage but she misplaced it. Per OV note  \"Plan:  - Follow up with Dr. Pilar Melgar for [address] workup and treatment  - Obtain CTA head and neck from [facility] for my review  - Referral placed to see Dr. Ashley Rios of ENT for further evaluation of muffled hearing and possible CSF leak  - I gave her a sterile collection cup to gather rhinorrhea for beta-2 transferrin testing - she will bring into the office when able  - Continue with weight management clinic  - Follow up after imaging is obtained and if possible after evaluation by DR. Salgado\"    Patient was provided with a new sterile collection cup labeled with name and .

## 2023-12-19 ENCOUNTER — TELEPHONE (OUTPATIENT)
Dept: SURGERY | Facility: CLINIC | Age: 38
End: 2023-12-19

## 2023-12-19 NOTE — TELEPHONE ENCOUNTER
Pt brought in Disc from [address] [place] [address]    MRI Cervical Dated 11/4/23  MRI Head W/O Dated 11/4/23  CT Head Stroke Code WO Dated 11/3/23  CT Head perfusion W/WO Dated 11/3/23    MRI Head W/WO 1/11/18  CT Head WO 1/13/18  CT Head WO 1/9/18    Will mail out disc.

## 2024-01-03 ENCOUNTER — OFFICE VISIT (OUTPATIENT)
Dept: OTOLARYNGOLOGY | Facility: CLINIC | Age: 39
End: 2024-01-03
Payer: MEDICAID

## 2024-01-03 ENCOUNTER — OFFICE VISIT (OUTPATIENT)
Dept: AUDIOLOGY | Facility: CLINIC | Age: 39
End: 2024-01-03
Payer: MEDICAID

## 2024-01-03 DIAGNOSIS — J34.89 RHINORRHEA: ICD-10-CM

## 2024-01-03 DIAGNOSIS — H93.A1 PULSATILE TINNITUS OF RIGHT EAR: ICD-10-CM

## 2024-01-03 DIAGNOSIS — H91.93 BILATERAL HEARING LOSS, UNSPECIFIED HEARING LOSS TYPE: Primary | ICD-10-CM

## 2024-01-03 DIAGNOSIS — H93.11 TINNITUS, RIGHT: Primary | ICD-10-CM

## 2024-01-03 DIAGNOSIS — H61.21 IMPACTED CERUMEN, RIGHT EAR: ICD-10-CM

## 2024-01-03 PROCEDURE — 92567 TYMPANOMETRY: CPT | Performed by: AUDIOLOGIST

## 2024-01-03 PROCEDURE — 31231 NASAL ENDOSCOPY DX: CPT | Performed by: STUDENT IN AN ORGANIZED HEALTH CARE EDUCATION/TRAINING PROGRAM

## 2024-01-03 PROCEDURE — 92557 COMPREHENSIVE HEARING TEST: CPT | Performed by: AUDIOLOGIST

## 2024-01-03 PROCEDURE — 99204 OFFICE O/P NEW MOD 45 MIN: CPT | Performed by: STUDENT IN AN ORGANIZED HEALTH CARE EDUCATION/TRAINING PROGRAM

## 2024-01-03 NOTE — PROGRESS NOTES
Angelo Steinberg is a 38 year old female.   Chief Complaint   Patient presents with    Ringing In Ear     C/o pulsations in her right ear. X 8 months.   Ear cleaning       ASSESSMENT AND PLAN:   1. Bilateral hearing loss, unspecified hearing loss type  38-year-old presents with multiple complaints including possible tinnitus in the right ear and left clear rhinorrhea when she bends over.  She also reports a little muffled sensation in her right ear.  She does chew gum fair amount was chewing gum in the office.  Denies any pain or hearing issues.  Reports intermittent but not all too much clearish nasal drainage from the left side of her nose.  This can sometimes happen when she eats as well.    Normal otologic exam.  Normal nasal endoscopy without any polyps or masses or signs of rhinorrhea during the exam.  Slight shifting of her temporomandibular joints.    Reviewed her MRI of her brain there were no fluid collections in her paranasal sinuses    Reviewed Dr. Sigala's note with plans to consult neurology and lumbar puncture for possible IIH.  He was also going to review a previous CT angiogram regarding her pulsatile tinnitus.    Audiogram and tympanograms today were normal    Reassured her of the largely normal ear exam and audiogram.  Also the largely normal nasal endoscopy.  Her rhinorrhea may be vasomotor rhinitis related as she notes it is worse when she eats although she might be also tilting her head forwards more when she eats.  She is not really getting much drainage to collect for beta-2 although she is going to continue trying.   If the beta-2 transferrin test returns negative may consider starting her on Atrovent nasal spray.  Given the normal audiogram, tympanogram and ear exam, her ear fullness may also be jaw joint related she was chewing gum during the visit I advised her to cease from doing this and discussed other conservative measures for TMJ.      Consult from Dr. Sigala regarding ear and  nose evaluation    - OP REFERRAL TO AUDIOLOGY    2. Impacted cerumen, right ear      3. Pulsatile tinnitus of right ear      4. Rhinorrhea        The patient indicates understanding of these issues and agrees to the plan.      EXAM:   LMP 02/28/2023 (Approximate)     Pertinent exam findings may also be noted above in assessment and plan     System Details   Skin Inspection - Normal.   Constitutional Overall appearance - Normal.   Head/Face Symmetric, TMJ tenderness not present    Eyes EOMI, PERRL   Right ear:  Canal clear, TM intact, no CHAKA   Left ear:  Canal clear, TM intact, no CHAKA   Nose: Septum midline, inferior turbinates not enlarged, nasal valves without collapse    Oral cavity/Oropharynx: No lesions or masses on inspection or palpation, tonsils symmetric    Neck: Soft without LAD, thyroid not enlarged  Voice clear/ no stridor   Other:      Scopes and Procedures:     Nasal Endoscopy Procedure Note     Due to inability for adequate examination of the nose and nasopharynx and need for magnification to perform the examination, endoscopy was performed.  Risks and benefits were discussed with patient/family and they have given verbal consent to proceed.    Pre-operative Diagnosis:   1. Bilateral hearing loss, unspecified hearing loss type    2. Impacted cerumen, right ear    3. Pulsatile tinnitus of right ear    4. Rhinorrhea        Post-operative Diagnosis: Same    Procedure: Diagnostic nasal endoscopy    Anesthesia: Topical anesthetic [place]     Surgeon Oneal Piedra MD    EBL: 0cc    Procedure Detail & Findings:     After placement of topical anesthetic intranasally the endoscope was inserted into each nares and driven through the nasal cavity into the nasopharynx. The following findings were noted:    Septum: Midline  Inferior turbinates: Normal  Middle meatus: Patent  Middle turbinates: Normal  Purulence: None noted  Polyps: None noted  Nasopharynx and eustachian tube: No masses  Other: The middle and superior  meatus, the turbinates, and the spheno-ethmoid recess were inspected and seen to be without significant abnormal findings.     Condition: Stable    Complications: Patient tolerated the procedure well with no immediate complication.    Oneal Piedra MD        Current Outpatient Medications   Medication Sig Dispense Refill    gabapentin 300 MG Oral Cap Take 1 capsule (300 mg total) by mouth every evening.      Cholecalciferol 50 MCG (2000 UT) Oral Cap Take 2,000 Units by mouth daily.      magnesium 250 MG Oral Tab Take 1 tablet (250 mg total) by mouth.      Omega-3 Fatty Acids (FISH OIL) 1200 MG Oral Cap Take by mouth.      metFORMIN  MG Oral Tablet 24 Hr Take 2 tablets (1,500 mg total) by mouth daily with food. Start 1 tab daily for 7 days, then increase to prescribed dose. 60 tablet 2    rizatriptan 10 MG Oral Tablet Dispersible use at onset; may repeat once after 2 hours- ONLY 2 IN 24 HOUR PERIOD MAX. This is a 30 day supply. 12 tablet 0    amphetamine-dextroamphetamine 20 MG Oral Tab Take 1 tablet by mouth every morning.        Past Medical History:   Diagnosis Date    ADHD     Essential hypertension     Heart murmur     Hyperlipidemia     Spinal stenosis     TIA (transient ischemic attack)     Urinary incontinence       Social History:  Social History     Socioeconomic History    Marital status:    Occupational History    Occupation: [employer]   Tobacco Use    Smoking status: Never    Smokeless tobacco: Never   Vaping Use    Vaping Use: Never used   Substance and Sexual Activity    Alcohol use: Yes     Comment: Rarely    Drug use: Never    Sexual activity: Yes     Partners: Male     Birth control/protection: Tubal Ligation   Other Topics Concern    Caffeine Concern No    Exercise No    Seat Belt Yes    Special Diet No    Stress Concern Yes    Weight Concern Yes    Blood Transfusions No   Social History Narrative    Live with  and sons          Oneal Piedra MD  1/3/2024  11:45 AM

## 2024-01-17 ENCOUNTER — OFFICE VISIT (OUTPATIENT)
Dept: OBGYN CLINIC | Facility: CLINIC | Age: 39
End: 2024-01-17
Payer: MEDICAID

## 2024-01-17 VITALS
HEIGHT: 63 IN | BODY MASS INDEX: 41.11 KG/M2 | WEIGHT: 232 LBS | SYSTOLIC BLOOD PRESSURE: 108 MMHG | DIASTOLIC BLOOD PRESSURE: 78 MMHG

## 2024-01-17 DIAGNOSIS — Z12.31 ENCOUNTER FOR SCREENING MAMMOGRAM FOR MALIGNANT NEOPLASM OF BREAST: ICD-10-CM

## 2024-01-17 DIAGNOSIS — R10.2 PELVIC PAIN IN FEMALE: Primary | ICD-10-CM

## 2024-01-17 DIAGNOSIS — N64.4 BREAST PAIN: ICD-10-CM

## 2024-01-17 LAB
BILIRUBIN: NEGATIVE
GLUCOSE (URINE DIPSTICK): NEGATIVE MG/DL
LEUKOCYTES: NEGATIVE
MULTISTIX EXPIRATION DATE: NORMAL DATE
MULTISTIX LOT#: NORMAL NUMERIC
NITRITE, URINE: NEGATIVE
OCCULT BLOOD: NEGATIVE
PH, URINE: 5.5 (ref 4.5–8)
PROTEIN (URINE DIPSTICK): NEGATIVE MG/DL
SPECIFIC GRAVITY: 1.02 (ref 1–1.03)
URINE-COLOR: YELLOW
UROBILINOGEN,SEMI-QN: 0.2 MG/DL (ref 0–1.9)

## 2024-01-17 PROCEDURE — 3078F DIAST BP <80 MM HG: CPT | Performed by: OBSTETRICS & GYNECOLOGY

## 2024-01-17 PROCEDURE — 87086 URINE CULTURE/COLONY COUNT: CPT | Performed by: OBSTETRICS & GYNECOLOGY

## 2024-01-17 PROCEDURE — 3074F SYST BP LT 130 MM HG: CPT | Performed by: OBSTETRICS & GYNECOLOGY

## 2024-01-17 PROCEDURE — 99214 OFFICE O/P EST MOD 30 MIN: CPT | Performed by: OBSTETRICS & GYNECOLOGY

## 2024-01-17 PROCEDURE — 3008F BODY MASS INDEX DOCD: CPT | Performed by: OBSTETRICS & GYNECOLOGY

## 2024-01-17 PROCEDURE — 81002 URINALYSIS NONAUTO W/O SCOPE: CPT | Performed by: OBSTETRICS & GYNECOLOGY

## 2024-01-17 RX ORDER — CETIRIZINE HYDROCHLORIDE 10 MG/1
10 TABLET ORAL DAILY
COMMUNITY

## 2024-01-17 NOTE — PROGRESS NOTES
CC: Patient is here for pelvic pressure. LMP 1/3    HPI: Patient is a 38 year old  for lower pelvic pressure x 4 day, constant, 9/10 in severity, worse with changing from sitting to standing. Recently she started exercising more.     She also continues to have aching pain in L arm pit for 4 M. Normal mammogram recently. Pain comes/goes, worse with laying on that side, lifting her arms above her head. Pain not related to her period.     Menses: less heavy, no longer bleeding in between her periods, no excessive pain with period.     Review of Systems:    Pertinent positive and negatives reviewed in HPI    CONSTITUTIONAL:  No weight loss, no fever, chills, no weakness, + fatigue  SKIN:  No rash, no  itching. No new facial or body hair, no new acne  GASTROINTESTINAL:  No anorexia, no nausea, no vomiting, no diarrhea, no abdominal pain, no blood in stool or blackened stools, no constipation, no bloating. + recent loose stools  GENITOURINARY:  No burning on urination. No urinary frequency.  No blood in urine. + rare urinary incontinence (had sling in past)  MUSCULOSKELETAL:  No muscle pain, no back pain, no joint pain, no stiffness.  HEMATOLOGIC:  No history of anemia, no excessive bleeding, no excessive bruising.  LYMPHATICS:  No enlarged nodes. No history of splenectomy.  PSYCHIATRIC:  No history of depression, no history of anxiety.  ENDOCRINOLOGIC:  No reports of sweating, no cold intolerance, no heat intolerance, no excessive urination, no excessive thirst  ALLERGIES:  No history of asthma, no hives, no eczema, no rhinitis.        Depression Screening (PHQ-2/PHQ-9): Over the LAST 2 WEEKS   Little interest or pleasure in doing things (over the last two weeks)?: Not at all    Feeling down, depressed, or hopeless (over the last two weeks)?: Not at all    PHQ-2 SCORE: 0          Patient's last menstrual period was 2024 (approximate).    OB History    Para Term  AB Living   8 5 5   3 5   SAB  IAB Ectopic Multiple Live Births   3       5      # Outcome Date GA Lbr Timothy/2nd Weight Sex Delivery Anes PTL Lv   8 Term 10/07/15     CS-LTranv   JYOTI   7 Term 12 38w6d   M CS-Unspec   JYOTI      Birth Comments: System Generated. Please review and update pregnancy details.   6 Term 08 39w0d 21:00 8 lb 9 oz (3.884 kg) M Vag-Spont   JYOTI   5 Term 05 38w0d 12:00 7 lb 8 oz (3.402 kg) M Vag-Spont   JYOTI      Birth Comments: PPROM @ 34 weeks   4 Term 02 37w0d 19:00 7 lb 1.5 oz (3.218 kg) M    JYOTI      Birth Comments: slight pre eclampsia  No MGSO4 at del.   3 SAB            2 SAB            1 SAB                GYN hx:    Hx Prior Abnormal Pap: No  Pap Date: 23  Pap Result Notes: WNL  LPS:    S/p TL    Past Medical History:   Diagnosis Date    ADHD     Essential hypertension     Heart murmur     Hyperlipidemia     Spinal stenosis     TIA (transient ischemic attack)     Urinary incontinence      Past Surgical History:   Procedure Laterality Date    APPENDECTOMY            X2    CHOLECYSTECTOMY      EVALUATE ONLY, BLADDER ([facility])      MIDURETHRAL SLING  2022    TVT @ [facility]    OTHER SURGICAL HISTORY      Gallbladder removal & 2 hernia repairs    TUBAL LIGATION      Yr      Allergies   Allergen Reactions    Cephalexin HIVES    Morphine NAUSEA AND VOMITING, ANGIOEDEMA and Tightness in Chest     Very anxious cannot catch breath      Sumatriptan OTHER (SEE COMMENTS)     Family History   Problem Relation Age of Onset    Hypertension Mother     Other (cervical cancer) Mother     Diabetes Father     Hypertension Father     Breast Cancer Maternal Aunt      Social History     Socioeconomic History    Marital status:      Spouse name: Not on file    Number of children: Not on file    Years of education: Not on file    Highest education level: Not on file   Occupational History    Occupation: [employer]   Tobacco Use    Smoking status: Never    Smokeless tobacco: Never   Vaping Use    Vaping  Use: Never used   Substance and Sexual Activity    Alcohol use: Yes     Comment: Rarely    Drug use: Never    Sexual activity: Yes     Partners: Male     Birth control/protection: Tubal Ligation   Other Topics Concern    Caffeine Concern No    Exercise No    Seat Belt Yes    Special Diet No    Stress Concern Yes    Weight Concern Yes     Service Not Asked    Blood Transfusions No    Occupational Exposure Not Asked    Hobby Hazards Not Asked    Sleep Concern Not Asked    Back Care Not Asked    Bike Helmet Not Asked    Self-Exams Not Asked   Social History Narrative    Live with  and sons     Social Determinants of Health     Financial Resource Strain: Not on file   Food Insecurity: Not on file   Transportation Needs: Not on file   Physical Activity: Not on file   Stress: Not on file   Social Connections: Not on file   Housing Stability: Not on file       Medications reviewed. See active list.     /78   Ht 63\"   Wt 232 lb (105.2 kg)   LMP 01/03/2024 (Approximate)   BMI 41.10 kg/m²       Exam:   GENERAL: well developed, well nourished, in no apparent distress  ABDOMEN: Soft, non distended;  + suprapubic tenderness, no rebound, no guarding, no masses.  Liver and spleen non-tender, no enlargement. No palpable hernias  GYNE/:  External Genitalia: Normal appearing, no lesions, normal hair distribution   Urethral meatus appear wnl, no abnormal discharge or lesions noted.   Bladder: well supported, urethra wnl, no palpable tenderness or masses, no discharge  Vagina: normal pink mucosa, no lesions, normal clear discharge.   Uterus: midline, mobile, non-tender, firm and smooth  Cervix: pink, no lesions grossly visible, no discharge  Adnexa: non tender, no palpable masses, normal size  Anus:  No lesions or visible hemorrhoids        A/P: Patient is 38 year old female     1. Pelvic pain in female  - Transvaginal US GYNE Only [43203]; Future    2. Encounter for screening mammogram for malignant neoplasm  of breast  - ALIYA BART 2D+3D SCREENING BILAT (CPT=77067/82188); Future  - US BREAST BILATERAL COMPLETE (CPT=76641-50); Future    3. Breast pain  - ALIYA BART 2D+3D SCREENING BILAT (CPT=77067/65897); Future  - US BREAST BILATERAL COMPLETE (CPT=76641-50); Future    Check UA&S  Recommend Violet iodine supplement for breast pain.     Mari Soni MD

## 2024-01-23 ENCOUNTER — TELEPHONE (OUTPATIENT)
Dept: OBGYN CLINIC | Facility: CLINIC | Age: 39
End: 2024-01-23

## 2024-01-23 DIAGNOSIS — N64.4 BREAST PAIN: Primary | ICD-10-CM

## 2024-01-23 NOTE — TELEPHONE ENCOUNTER
[facility] is requesting the following order for the patient.       Diagnostic bilateral mammogram w/ us to follow if needed.

## 2024-02-05 ENCOUNTER — HOSPITAL ENCOUNTER (OUTPATIENT)
Dept: MAMMOGRAPHY | Facility: HOSPITAL | Age: 39
Discharge: HOME | End: 2024-02-05
Attending: OBSTETRICS & GYNECOLOGY
Payer: MEDICAID

## 2024-02-05 DIAGNOSIS — N64.4 BREAST PAIN: ICD-10-CM

## 2024-02-05 PROCEDURE — 77062 BREAST TOMOSYNTHESIS BI: CPT | Performed by: OBSTETRICS & GYNECOLOGY

## 2024-02-05 PROCEDURE — 77066 DX MAMMO INCL CAD BI: CPT | Performed by: OBSTETRICS & GYNECOLOGY

## 2024-02-22 RX ORDER — METFORMIN HYDROCHLORIDE 750 MG/1
TABLET, EXTENDED RELEASE ORAL
Qty: 180 TABLET | Refills: 0 | OUTPATIENT
Start: 2024-02-22

## 2024-02-22 NOTE — TELEPHONE ENCOUNTER
Requesting   Requested Prescriptions     Pending Prescriptions Disp Refills    METFORMIN  MG Oral Tablet 24 Hr [Pharmacy Med Name: METFORMIN ER 750MG 24HR TABS] 180 tablet 0     Sig: TAKE 1 TABLET BY MOUTH EVERY DAY FOR 7 DAYS, THEN INCREASE TO 2 TABLETS BY MOUTH EVERY DAY WITH FOOD     LOV: 11/14/23  RTC: 2 months  Filled: 11/14/23 #60 with 2 refills    Future Appointments   Date Time Provider Department Center   2/24/2024  9:30 PM [facility]   2/28/2024 10:30 AM Choco Garcia MD [facility]   3/8/2024 10:30 AM Floresita Ray, PA-C [facility]     Needs appt

## 2024-02-28 ENCOUNTER — OFFICE VISIT (OUTPATIENT)
Dept: NEUROLOGY | Facility: CLINIC | Age: 39
End: 2024-02-28
Payer: MEDICAID

## 2024-02-28 VITALS — BODY MASS INDEX: 41.11 KG/M2 | WEIGHT: 232 LBS | HEIGHT: 63 IN

## 2024-02-28 DIAGNOSIS — G81.94 LEFT HEMIPARESIS (HCC): ICD-10-CM

## 2024-02-28 DIAGNOSIS — E23.6 EMPTY SELLA (HCC): ICD-10-CM

## 2024-02-28 DIAGNOSIS — G43.009 MIGRAINE WITHOUT AURA AND WITHOUT STATUS MIGRAINOSUS, NOT INTRACTABLE: Primary | ICD-10-CM

## 2024-02-28 PROCEDURE — 99214 OFFICE O/P EST MOD 30 MIN: CPT | Performed by: OTHER

## 2024-02-28 NOTE — PROGRESS NOTES
Neurology follow-up visit     Referred By: Dr. De La Roas ref. provider found    Chief Complaint:   Chief Complaint   Patient presents with    Migraine     LOV 05/25/2023 Patient presents with migraine. Patient states that she still having symptoms but not as severe as before.Patient is not taking medications for migraine.       HPI:     Angelo Steinberg is a 39 year old female, who presents for history of migraines and headaches for a number of years.  Patient has had history of migraines for quite some years.  Sometimes she gets complicated migraines when she was not able to even speak and ended up in the hospital in the past for those.  She describes severe, throbbing pain  Associated with lethargy, nausea, she has seen neurologist in 2021 at [facility] who felt those were more consistent with migraines and apparently tried amitriptyline with no benefit, propranolol was tried next, she does not recall what happened with that medication.  Sumatriptan was tried as abortive therapy but possibly caused abdominal pain and had to be stopped.    MRI of the brain was done some years ago and was nonrevealing.  CT of the head was done in 2023, showed empty sella and therefore possibility of lumbar puncture was discussed.  But apparently she was seen by ophthalmologist and no papilledema was seen.  Patient was reluctant to consider medications in the past.  At that time she agreed to start topiramate but apparently it was not continued.  Instead patient tried to adjust her lifestyle, she was sleeping better, she was hydrating more and her headaches overall have improved significantly.  The meantime she also was concerned on the clear liquid running out of her nose occasionally.  She was seen by neurosurgeon, she was told to have a collection of the fluid to be tested.  But she was not able to do that yet.  Headaches much less severe and frequent.  Essentially no severe migraines since 2023.  She only has blurry vision with  the headache itself.  She was seen by an ophthalmologist, no papilledema was seen again    .     Past Medical History:   Diagnosis Date    ADHD     Essential hypertension     Heart murmur     Hyperlipidemia     Spinal stenosis     TIA (transient ischemic attack)     Urinary incontinence        Past Surgical History:   Procedure Laterality Date    APPENDECTOMY            X2    CHOLECYSTECTOMY      EVALUATE ONLY, BLADDER ([facility])      MIDURETHRAL SLING  2022    TVT @ [facility]    OTHER SURGICAL HISTORY      Gallbladder removal & 2 hernia repairs    TUBAL LIGATION      Yr        Social history:  History   Smoking Status    Never   Smokeless Tobacco    Never     History   Alcohol Use    Yes     Comment: Rarely     History   Drug Use Unknown       Family History   Problem Relation Age of Onset    Ovarian Cancer Mother 28    Hypertension Mother     Other (cervical cancer) Mother     Diabetes Father     Hypertension Father     Breast Cancer Maternal Aunt 60         Current Outpatient Medications:     cetirizine 10 MG Oral Tab, Take 1 tablet (10 mg total) by mouth daily., Disp: , Rfl:     gabapentin 300 MG Oral Cap, Take 1 capsule (300 mg total) by mouth every evening., Disp: , Rfl:     Cholecalciferol 50 MCG (2000 UT) Oral Cap, Take 2,000 Units by mouth daily., Disp: , Rfl:     magnesium 250 MG Oral Tab, Take 1 tablet (250 mg total) by mouth., Disp: , Rfl:     Omega-3 Fatty Acids (FISH OIL) 1200 MG Oral Cap, Take by mouth., Disp: , Rfl:     Allergies   Allergen Reactions    Cephalexin HIVES    Morphine NAUSEA AND VOMITING, ANGIOEDEMA and Tightness in Chest     Very anxious cannot catch breath      Sumatriptan OTHER (SEE COMMENTS)       ROS:   As in HPI, the rest of the 14 system review was done and was negative      Physical Exam:  Vitals:    24 1048   Weight: 232 lb (105.2 kg)   Height: 63\"       General: No apparent distress, well nourished, well groomed.  Head- Normocephalic, atraumatic  Eyes- No  redness or swelling  ENT- Hearing intake, normal glutition  Neck- No masses or adenopathy  Cv: pulses were palpable and normal, no cyanosis or edema     Neurological:     Mental Status- Alert and oriented x3.  Normal attention span and concentration  Thought process intact  Memory intact- recent and remote  Mood intact  Fund of knowledge appropriate for education and age    Language intact including: comprehension, naming, repetition, vocabulary    Cranial Nerves:    VII. Face symmetric, no facial weakness  VIII. Hearing intact to whisper.  IX. Pallet elevates symmetrically.  XI. Shoulder shrug is intact  XII. Tongue is midline    Motor Exam:  Muscle tone normal  No atrophy or fasciculations  Strength- upper extremities 5/5 proximally and distally                  Rapid alternating movements intact    Gait:  Normal posture  Normal physiologic      Labs:    No results found for: \"TSH\"  No results found for: \"CHOL\", \"HDL\", \"LDL\", \"TRIG\"  Lab Results   Component Value Date    HGB 11.6 (L) 12/03/2022    HCT 37.6 12/03/2022    MCV 85.8 12/03/2022    WBC 9.1 12/03/2022    .0 12/03/2022      Lab Results   Component Value Date    BUN 14 12/03/2022    CA 9.2 12/03/2022     12/03/2022    K 4.1 12/03/2022     12/03/2022    CO2 26.0 12/03/2022      I have reviewed labs.    Reviewed report of the neurology from 2021 as well as CT of the hand reported.      Assessment   1. Migraine without aura and without status migrainosus, not intractable  Possibility of migraines, but some remote possibility of IIH could be entertained, but there is no papilledema.  We discussed possibility of lumbar puncture to evaluate that possibility, but patient decided against at this time.  It seems that lifestyle changes that she implemented were quite helpful in terms of controlling her headaches.  I will repeat MRI of the brain with and without contrast since she was unable to complain collection of fluid to rule out any  possibility of enhancing changes of kidney consistent with low pressures..      2. Empty sella (HCC)      3. Tinnitus of both ears             Education and counseling provided to patient. Instructed patient to call my office or seek medical attention immediately if symptoms worsen.  Patient verbalized understanding of information given. All questions were answered. All side effects of drugs were discussed.       Return to clinic in: No follow-ups on file.    Choco Garcia MD

## 2024-03-02 ENCOUNTER — OFFICE VISIT (OUTPATIENT)
Dept: INTERNAL MEDICINE CLINIC | Facility: CLINIC | Age: 39
End: 2024-03-02

## 2024-03-02 VITALS
SYSTOLIC BLOOD PRESSURE: 126 MMHG | HEART RATE: 67 BPM | BODY MASS INDEX: 42.7 KG/M2 | WEIGHT: 241 LBS | OXYGEN SATURATION: 95 % | DIASTOLIC BLOOD PRESSURE: 84 MMHG | HEIGHT: 63 IN

## 2024-03-02 DIAGNOSIS — R11.0 NAUSEA: Primary | ICD-10-CM

## 2024-03-02 DIAGNOSIS — R53.83 FATIGUE, UNSPECIFIED TYPE: ICD-10-CM

## 2024-03-02 DIAGNOSIS — R49.9 CHANGE IN VOICE: ICD-10-CM

## 2024-03-02 DIAGNOSIS — R35.0 URINARY FREQUENCY: ICD-10-CM

## 2024-03-02 LAB
APPEARANCE: CLEAR
BILIRUBIN: NEGATIVE
CONTROL LINE PRESENT WITH A CLEAR BACKGROUND (YES/NO): YES YES/NO
GLUCOSE (URINE DIPSTICK): NEGATIVE MG/DL
KETONES (URINE DIPSTICK): NEGATIVE MG/DL
KIT LOT #: NORMAL NUMERIC
NITRITE, URINE: NEGATIVE
PH, URINE: 5 (ref 4.5–8)
PREGNANCY TEST, URINE: NEGATIVE
PROTEIN (URINE DIPSTICK): NEGATIVE MG/DL
SPECIFIC GRAVITY: 1.02 (ref 1–1.03)
URINE-COLOR: YELLOW
UROBILINOGEN,SEMI-QN: 0.2 MG/DL (ref 0–1.9)

## 2024-03-02 PROCEDURE — 81002 URINALYSIS NONAUTO W/O SCOPE: CPT

## 2024-03-02 PROCEDURE — 81025 URINE PREGNANCY TEST: CPT

## 2024-03-02 PROCEDURE — 99204 OFFICE O/P NEW MOD 45 MIN: CPT

## 2024-03-02 RX ORDER — NITROFURANTOIN 25; 75 MG/1; MG/1
100 CAPSULE ORAL 2 TIMES DAILY
Qty: 10 CAPSULE | Refills: 0 | Status: SHIPPED | OUTPATIENT
Start: 2024-03-02 | End: 2024-03-07

## 2024-03-02 NOTE — PROGRESS NOTES
Subjective:   Angelo Steinberg is a 39 year old female who presents for Nausea and Fatigue     Presents with c/c of fatigue- does have sleep study scheduled  For the past two weeks she has been feeling extremely sleepy and feels like she is nodding off while driving   Has not been doing [employer] because afraid of falling asleep while driving for the past 1.5 weeks  Unable to watch movies and even tried drinking coffee but still fell asleep  Tried sleeping earlier and limiting screen time with no improvement  She falls asleep quickly but wakes up once or twice at night to use the bathroom   states she snores a lot - has a sleep study scheduled for April   Not refreshed by sleep   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     Has also been getting nausea or queasy feelings while driving or after eating   No cough, runny nose, chills, body aches, fever   Vomited a few times last week, unsure if it was after eating   Once was triggered by the smell of something her  was cooking- it was something she likes so this was unusual  Unable to eat broccoli now averse to the smell but used to love it   Sensitive to certain smells for the past month     Started having horrible itching last year and skin reacts easily and swells   Saw an allergist and rx cetirizine and if she misses a day she gets swelling and itching    No drowsiness after starting the allergy medication    Tubes were tied in 2015   Periods are irregular   Last one lasted 2.5 days and very minimal bleeding    Does note some flank pain and increased frequency of urination    Would like referral to ENT- had history of throat problems with past biopsy and is noticing voice changes again   Feels something on the right side of her throat when she tries to sing    History/Other:    Chief Complaint Reviewed and Verified  No Further Nursing Notes to   Review  Tobacco Reviewed  Allergies Reviewed  Medications Reviewed    Problem List Reviewed   Medical History Reviewed  Surgical History   Reviewed  OB Status Reviewed  Family History Reviewed         Tobacco:  She has never smoked tobacco.    Current Outpatient Medications   Medication Sig Dispense Refill    nitrofurantoin monohydrate macro 100 MG Oral Cap Take 1 capsule (100 mg total) by mouth 2 (two) times daily for 5 days. 10 capsule 0    cetirizine 10 MG Oral Tab Take 1 tablet (10 mg total) by mouth daily.      gabapentin 300 MG Oral Cap Take 1 capsule (300 mg total) by mouth as needed.      Cholecalciferol 50 MCG (2000 UT) Oral Cap Take 2,000 Units by mouth daily.      magnesium 250 MG Oral Tab Take 1 tablet (250 mg total) by mouth.      Omega-3 Fatty Acids (FISH OIL) 1200 MG Oral Cap Take by mouth.           Review of Systems:  Review of Systems   Constitutional:  Positive for fatigue.   Respiratory: Negative.     Cardiovascular: Negative.    Gastrointestinal:  Positive for nausea and vomiting.   Skin: Negative.    Neurological: Negative.          Objective:   /84   Pulse 67   Ht 5' 3\" (1.6 m)   Wt 241 lb (109.3 kg)   LMP 01/23/2024 (Approximate)   SpO2 95%   BMI 42.69 kg/m²  Estimated body mass index is 42.69 kg/m² as calculated from the following:    Height as of this encounter: 5' 3\" (1.6 m).    Weight as of this encounter: 241 lb (109.3 kg).  Physical Exam  Vitals reviewed.   Constitutional:       General: She is not in acute distress.     Appearance: Normal appearance. She is well-developed.   Cardiovascular:      Rate and Rhythm: Normal rate and regular rhythm.      Heart sounds: Normal heart sounds.   Pulmonary:      Effort: Pulmonary effort is normal.      Breath sounds: Normal breath sounds.   Abdominal:      General: Bowel sounds are normal.      Palpations: Abdomen is soft.   Skin:     General: Skin is warm and dry.   Neurological:      Mental Status: She is alert and oriented to person, place, and time.           Assessment & Plan:   1. Nausea (Primary)  -     POC  Urinalysis, Manual Dip without microscopy [[id number]]  -     Urine Pregnancy Test  2. Fatigue, unspecified type  -     POC Urinalysis, Manual Dip without microscopy [[id number]]  -     Urine Pregnancy Test  3. Urinary frequency  -     Nitrofurantoin Monohyd Macro; Take 1 capsule (100 mg total) by mouth 2 (two) times daily for 5 days.  Dispense: 10 capsule; Refill: 0  UA with signs of UTI and has some symptoms so will treat  4. Change in voice  -     ENT Referral - [place] ([facility])    Discussed fatigue and sleepiness most likely related to sleep apnea- continue with scheduled sleep study, avoid alcohol or sedating medications, avoid drowsy driving      DIANNE House, 3/2/2024, 11:16 AM

## 2024-03-08 ENCOUNTER — OFFICE VISIT (OUTPATIENT)
Dept: INTEGRATIVE MEDICINE | Facility: CLINIC | Age: 39
End: 2024-03-08
Payer: MEDICAID

## 2024-03-08 VITALS
BODY MASS INDEX: 42.77 KG/M2 | WEIGHT: 241.38 LBS | SYSTOLIC BLOOD PRESSURE: 120 MMHG | DIASTOLIC BLOOD PRESSURE: 70 MMHG | HEIGHT: 63 IN | OXYGEN SATURATION: 97 % | HEART RATE: 73 BPM

## 2024-03-08 DIAGNOSIS — N92.6 ABNORMAL MENSES: ICD-10-CM

## 2024-03-08 DIAGNOSIS — R14.0 BLOATING: ICD-10-CM

## 2024-03-08 DIAGNOSIS — R68.82 LOW LIBIDO: ICD-10-CM

## 2024-03-08 DIAGNOSIS — L60.3 BRITTLE NAILS: ICD-10-CM

## 2024-03-08 DIAGNOSIS — R53.83 OTHER FATIGUE: Primary | ICD-10-CM

## 2024-03-08 DIAGNOSIS — F43.9 STRESS: ICD-10-CM

## 2024-03-08 DIAGNOSIS — L67.8 ABNORMAL FACIAL HAIR: ICD-10-CM

## 2024-03-08 DIAGNOSIS — L65.9 HAIR LOSS: ICD-10-CM

## 2024-03-08 DIAGNOSIS — R63.5 WEIGHT GAIN: ICD-10-CM

## 2024-03-08 NOTE — PATIENT INSTRUCTIONS
[facility] - diagnosis for ADHD  Dr. Palma in [facility] - PCP - takes your insurance and does questions for ADHD.     Continue Gluten Free   Pay attention to dairy and see if it causes bloat   Cheese, yogurt, butter, milk     3 meals a day no snacking  Protein goal 75- 100g a day (protein with every meal)   Fiber goal >25g a day  Net carb goal <30g per meal  Total carbs - fiber = net carbs     Exercise: start with walking 20 minutes 3 times a week and increase as tolerated, moderate strength training is important for muscle and bone maintenance     Fruit Carbohydrates per serving Fiber per serving Net carbs   Grapes (1Cup/151g)  26g 0.8g 25.2g   Banana (1 medium) 24g 3.1g 21g   Pear (1 medium) 22g 6g 16g   Apple (1 medium) 21g 4.4g 16.6g         Pineapple (1Cup/165g) 29g 2.3g 26.7   Blueberries (1Cup/148g) 17g 4g 13g   Cantaloupe (1Cup/177g) 14g 1.6g 12.4g   Oranges (1 medium) 12g 2.3g 9.7g   Watermelon (1Cup/154g) 12g 0.6g 11.4g   Peaches (1 Large) 9.5g 3g 6.5   Kiwi (1 medium) 9g 2.1g 6.9   Avocado (half of one) 9.5g 4.6g 4.9g   Strawberries (1Cup/144g) 8g 3 5g   Lime (1 fruit) 7g 1.9g 5.1g   Lemon (1 fruit) 6g 1.6g 4.4g   Tomatoes (1 fruit) 3.2g  1.5g 1.7g   Vegetables Carbohydrates per serving  Fiber per serving  Net carbs per serving    Asparagus (5 roy/93.5g) 4g 1.5g 2.5g   Bell Pepper (1 medium) 6g 2g 4g   Broccoli (1 medium stalk) 8g 6g 2g   Carrot (1 carrot 7.5inches long) 7g 1.7g 5.3g   Cauliflower (99g)  5g 2g 3g   Celery (2 medium stalks) 4g 2g 2g   Cucumber (? medium) 2g 1g 1g   Green Beans (3/4C)  5g 3g 2g   Green Cabbage (84g) 5g 2g 3g   Green Onion (1/4C chopped) 2g 1g 1g   Iceberg lettuce (89g)  2g 1.1g 0.9g   Jalapeno (1C sliced 6g 2.5g 3.5g   Leaf lettuce (1/2C shredded) 2g 1g 1.g   Mushrooms (5 medium) 3g 1g 2g   Onion (1 medium) 11g 1.9g 9g   Potatoe (1 medium/148g) 26g 2g 24g   Radishes (7 radishes) 3g 0.7g 2.3g   Summer Squash (½ medium) 4g 1.1g 2.9g   Sweet corn (1 medium  ear) 18g 2.4g 15.6   Sweet Potato (1 medium) 23g 4g 19g             One capsule with every meal    The following website can be utilized to purchase supplements.     [email or web address]
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
## 2024-03-09 ENCOUNTER — LAB ENCOUNTER (OUTPATIENT)
Dept: LAB | Facility: HOSPITAL | Age: 39
End: 2024-03-09
Attending: PHYSICIAN ASSISTANT
Payer: MEDICAID

## 2024-03-09 DIAGNOSIS — F43.9 STRESS: ICD-10-CM

## 2024-03-09 DIAGNOSIS — N92.6 ABNORMAL MENSES: ICD-10-CM

## 2024-03-09 DIAGNOSIS — R63.5 WEIGHT GAIN: ICD-10-CM

## 2024-03-09 DIAGNOSIS — R53.83 OTHER FATIGUE: ICD-10-CM

## 2024-03-09 DIAGNOSIS — R14.0 BLOATING: ICD-10-CM

## 2024-03-09 LAB
ALBUMIN SERPL-MCNC: 4.2 G/DL (ref 3.2–4.8)
ALBUMIN/GLOB SERPL: 1.3 {RATIO} (ref 1–2)
ALP LIVER SERPL-CCNC: 105 U/L
ALT SERPL-CCNC: 15 U/L
ANION GAP SERPL CALC-SCNC: 5 MMOL/L (ref 0–18)
AST SERPL-CCNC: 15 U/L (ref ?–34)
BILIRUB SERPL-MCNC: 0.4 MG/DL (ref 0.3–1.2)
BUN BLD-MCNC: 10 MG/DL (ref 9–23)
BUN/CREAT SERPL: 18.9 (ref 10–20)
CALCIUM BLD-MCNC: 9.2 MG/DL (ref 8.7–10.4)
CHLORIDE SERPL-SCNC: 107 MMOL/L (ref 98–112)
CO2 SERPL-SCNC: 28 MMOL/L (ref 21–32)
CORTIS SERPL-MCNC: 6.2 UG/DL
CREAT BLD-MCNC: 0.53 MG/DL
DHEA-S SERPL-MCNC: 272.3 UG/DL
EGFRCR SERPLBLD CKD-EPI 2021: 119 ML/MIN/1.73M2 (ref 60–?)
ESTRADIOL SERPL-MCNC: 21.5 PG/ML
FASTING STATUS PATIENT QL REPORTED: YES
FSH SERPL-ACNC: 6.9 MIU/ML
GLOBULIN PLAS-MCNC: 3.3 G/DL (ref 2.8–4.4)
GLUCOSE BLD-MCNC: 93 MG/DL (ref 70–99)
INSULIN SERPL-ACNC: 134 MU/L (ref 3–25)
LH SERPL-ACNC: 1.6 MIU/ML
OSMOLALITY SERPL CALC.SUM OF ELEC: 289 MOSM/KG (ref 275–295)
POTASSIUM SERPL-SCNC: 3.5 MMOL/L (ref 3.5–5.1)
PROGEST SERPL-MCNC: 0.7 NG/ML
PROLACTIN SERPL-MCNC: 10.1 NG/ML
PROT SERPL-MCNC: 7.5 G/DL (ref 5.7–8.2)
SODIUM SERPL-SCNC: 140 MMOL/L (ref 136–145)
T3FREE SERPL-MCNC: 3.05 PG/ML (ref 2.4–4.2)
T4 FREE SERPL-MCNC: 1.1 NG/DL (ref 0.8–1.7)
THYROPEROXIDASE AB SERPL-ACNC: <28 U/ML (ref ?–60)
TSI SER-ACNC: 1.26 MIU/ML (ref 0.55–4.78)

## 2024-03-09 PROCEDURE — 84482 T3 REVERSE: CPT

## 2024-03-09 PROCEDURE — 84439 ASSAY OF FREE THYROXINE: CPT

## 2024-03-09 PROCEDURE — 82679 ASSAY OF ESTRONE: CPT

## 2024-03-09 PROCEDURE — 82533 TOTAL CORTISOL: CPT

## 2024-03-09 PROCEDURE — 83525 ASSAY OF INSULIN: CPT

## 2024-03-09 PROCEDURE — 83001 ASSAY OF GONADOTROPIN (FSH): CPT

## 2024-03-09 PROCEDURE — 82670 ASSAY OF TOTAL ESTRADIOL: CPT

## 2024-03-09 PROCEDURE — 84146 ASSAY OF PROLACTIN: CPT

## 2024-03-09 PROCEDURE — 84443 ASSAY THYROID STIM HORMONE: CPT

## 2024-03-09 PROCEDURE — 83002 ASSAY OF GONADOTROPIN (LH): CPT

## 2024-03-09 PROCEDURE — 36415 COLL VENOUS BLD VENIPUNCTURE: CPT

## 2024-03-09 PROCEDURE — 86376 MICROSOMAL ANTIBODY EACH: CPT

## 2024-03-09 PROCEDURE — 84140 ASSAY OF PREGNENOLONE: CPT

## 2024-03-09 PROCEDURE — 84410 TESTOSTERONE BIOAVAILABLE: CPT

## 2024-03-09 PROCEDURE — 86800 THYROGLOBULIN ANTIBODY: CPT

## 2024-03-09 PROCEDURE — 82627 DEHYDROEPIANDROSTERONE: CPT

## 2024-03-09 PROCEDURE — 84144 ASSAY OF PROGESTERONE: CPT

## 2024-03-09 PROCEDURE — 84481 FREE ASSAY (FT-3): CPT

## 2024-03-09 PROCEDURE — 80053 COMPREHEN METABOLIC PANEL: CPT

## 2024-03-10 LAB — THYROGLOB SERPL-MCNC: <15 U/ML (ref ?–60)

## 2024-03-14 LAB
ESTRONE: 52 PG/ML
REVERSE T3: 16.1 NG/DL
SEX HORM BIND GLOB: 42.4 NMOL/L
TESTOST % FREE+WEAK BND: 12.5 %
TESTOST FREE+WEAK BND: 3.8 NG/DL
TESTOSTERONE TOT /MS: 30.4 NG/DL

## 2024-03-20 LAB — PREGNENOLONE: <10 NG/DL

## 2024-03-26 ENCOUNTER — E-VISIT (OUTPATIENT)
Dept: TELEHEALTH | Age: 39
End: 2024-03-26
Payer: MEDICAID

## 2024-03-26 DIAGNOSIS — R19.7 DIARRHEA, UNSPECIFIED TYPE: Primary | ICD-10-CM

## 2024-03-26 DIAGNOSIS — R19.5 MUCUS IN STOOL: ICD-10-CM

## 2024-03-26 PROCEDURE — 99421 OL DIG E/M SVC 5-10 MIN: CPT | Performed by: NURSE PRACTITIONER

## 2024-03-28 NOTE — PROGRESS NOTES
Angelo Steinberg is a 39 year old female submitting e-visit for diarrhea x 5-6 days.  HPI:   See answers to questionnaire and [email or web address] message exchange  Reports mucus in stool; 5-8 episodes of diarrhea/day    Current Outpatient Medications   Medication Sig Dispense Refill    cetirizine 10 MG Oral Tab Take 1 tablet (10 mg total) by mouth daily.      gabapentin 300 MG Oral Cap Take 1 capsule (300 mg total) by mouth as needed.      Cholecalciferol 50 MCG (2000 UT) Oral Cap Take 2,000 Units by mouth daily.      magnesium 250 MG Oral Tab Take 1 tablet (250 mg total) by mouth.      Omega-3 Fatty Acids (FISH OIL) 1200 MG Oral Cap Take by mouth.        Past Medical History:   Diagnosis Date    ADHD     Essential hypertension     Heart murmur     Hyperlipidemia     Spinal stenosis     TIA (transient ischemic attack)     Urinary incontinence       Past Surgical History:   Procedure Laterality Date    APPENDECTOMY            X2    CHOLECYSTECTOMY      EVALUATE ONLY, BLADDER (DMG)      MIDURETHRAL SLING  2022    TVT @ [facility]    OTHER SURGICAL HISTORY      Gallbladder removal & 2 hernia repairs    TUBAL LIGATION      Yr       Family History   Problem Relation Age of Onset    Ovarian Cancer Mother 28    Hypertension Mother     Other (cervical cancer) Mother     Diabetes Father     Hypertension Father     Breast Cancer Maternal Aunt 60      Social History:  Social History     Socioeconomic History    Marital status:    Occupational History    Occupation: [employer]   Tobacco Use    Smoking status: Never    Smokeless tobacco: Never   Vaping Use    Vaping Use: Never used   Substance and Sexual Activity    Alcohol use: Yes     Comment: Rarely    Drug use: Never    Sexual activity: Yes     Partners: Male     Birth control/protection: Tubal Ligation   Other Topics Concern    Caffeine Concern No    Exercise No    Seat Belt Yes    Special Diet No    Stress Concern Yes    Weight Concern Yes    Blood Transfusions No    Social History Narrative    Live with  and sons         ASSESSMENT AND PLAN:       Diagnoses and all orders for this visit:    Diarrhea, unspecified type    Mucus in stool      After reviewing questionnaire, a higher level of care was recommended d/t limitations of telehealth.   Referred to PCP or IC for in-person exam to guide treatment decisions  Refer to [employer] message exchange for specific patient instructions    Duration of  the service:  9 minutes

## 2024-04-09 ENCOUNTER — HOSPITAL ENCOUNTER (OUTPATIENT)
Age: 39
Discharge: HOME | End: 2024-04-09
Payer: MEDICAID

## 2024-04-09 VITALS
TEMPERATURE: 99 F | HEART RATE: 80 BPM | OXYGEN SATURATION: 100 % | SYSTOLIC BLOOD PRESSURE: 117 MMHG | RESPIRATION RATE: 16 BRPM | DIASTOLIC BLOOD PRESSURE: 50 MMHG

## 2024-04-09 DIAGNOSIS — R19.7 DIARRHEA, UNSPECIFIED TYPE: Primary | ICD-10-CM

## 2024-04-09 DIAGNOSIS — E86.0 MILD DEHYDRATION: ICD-10-CM

## 2024-04-09 LAB
#MXD IC: 1.2 X10ˆ3/UL (ref 0.1–1)
B-HCG UR QL: NEGATIVE
BILIRUB UR QL STRIP: NEGATIVE
BUN BLD-MCNC: 9 MG/DL (ref 7–18)
CHLORIDE BLD-SCNC: 105 MMOL/L (ref 98–112)
CO2 BLD-SCNC: 23 MMOL/L (ref 21–32)
CREAT BLD-MCNC: 0.4 MG/DL
EGFRCR SERPLBLD CKD-EPI 2021: 127 ML/MIN/1.73M2 (ref 60–?)
GLUCOSE BLD-MCNC: 94 MG/DL (ref 70–99)
GLUCOSE UR STRIP-MCNC: NEGATIVE MG/DL
HCT VFR BLD AUTO: 41.2 %
HCT VFR BLD CALC: 46 %
HGB BLD-MCNC: 13.6 G/DL
ISTAT IONIZED CALCIUM FOR CHEM 8: 1 MMOL/L (ref 1.12–1.32)
KETONES UR STRIP-MCNC: NEGATIVE MG/DL
LEUKOCYTE ESTERASE UR QL STRIP: NEGATIVE
LYMPHOCYTES # BLD AUTO: 2.9 X10ˆ3/UL (ref 1–4)
LYMPHOCYTES NFR BLD AUTO: 26.9 %
MCH RBC QN AUTO: 29.5 PG (ref 26–34)
MCHC RBC AUTO-ENTMCNC: 33 G/DL (ref 31–37)
MCV RBC AUTO: 89.4 FL (ref 80–100)
MIXED CELL %: 11.2 %
NEUTROPHILS # BLD AUTO: 6.5 X10ˆ3/UL (ref 1.5–7.7)
NEUTROPHILS NFR BLD AUTO: 61.9 %
NITRITE UR QL STRIP: NEGATIVE
PH UR STRIP: 5.5 [PH]
POTASSIUM BLD-SCNC: 4 MMOL/L (ref 3.6–5.1)
PROT UR STRIP-MCNC: 30 MG/DL
RBC # BLD AUTO: 4.61 X10ˆ6/UL
SODIUM BLD-SCNC: 141 MMOL/L (ref 136–145)
SP GR UR STRIP: 1.02
UROBILINOGEN UR STRIP-ACNC: <2 MG/DL
WBC # BLD AUTO: 10.6 X10ˆ3/UL (ref 4–11)

## 2024-04-09 PROCEDURE — 85025 COMPLETE CBC W/AUTO DIFF WBC: CPT | Performed by: NURSE PRACTITIONER

## 2024-04-09 PROCEDURE — 81025 URINE PREGNANCY TEST: CPT | Performed by: NURSE PRACTITIONER

## 2024-04-09 PROCEDURE — 81002 URINALYSIS NONAUTO W/O SCOPE: CPT | Performed by: NURSE PRACTITIONER

## 2024-04-09 PROCEDURE — 99213 OFFICE O/P EST LOW 20 MIN: CPT | Performed by: NURSE PRACTITIONER

## 2024-04-09 PROCEDURE — 80047 BASIC METABLC PNL IONIZED CA: CPT | Performed by: NURSE PRACTITIONER

## 2024-04-09 NOTE — ED INITIAL ASSESSMENT (HPI)
Pt c/o nausea, diarrhea, bloating after eating x2 weeks. States diarrhea got better then started again x2 days.  No fever.  No blood in stool.  No vomiting.

## 2024-04-09 NOTE — ED PROVIDER NOTES
Patient Seen in: [facility]      History     Chief Complaint   Patient presents with    Diarrhea     Stated Complaint: Diarrhea    Subjective:   HPI  Patient is a 39-year-old female with hypertension and irritable bowel syndrome.  She presents the immediate care center with concern for diarrhea that has been persistent for 2 to 3 weeks.  She describes a couple of episodes most days during this time.  She has been taking Imodium over-the-counter which is providing some relief.  She is concerned because it persisted.  She recognizes with her IBS that she has had some food sensitivities in the past, but cannot relate this diarrhea to food intake.  She denies known illness exposure.  She has had no fever or chills; no abdominal pain.          Objective:   Past Medical History:   Diagnosis Date    ADHD     Essential hypertension     Heart murmur     Hyperlipidemia     Spinal stenosis     TIA (transient ischemic attack)     Urinary incontinence               Past Surgical History:   Procedure Laterality Date    APPENDECTOMY            X2    CHOLECYSTECTOMY      EVALUATE ONLY, BLADDER ([facility])      MIDURETHRAL SLING  2022    TVT @ [facility]    OTHER SURGICAL HISTORY      Gallbladder removal & 2 hernia repairs    TUBAL LIGATION      Yr                 Social History     Socioeconomic History    Marital status:    Occupational History    Occupation: [employer]   Tobacco Use    Smoking status: Never    Smokeless tobacco: Never   Vaping Use    Vaping Use: Never used   Substance and Sexual Activity    Alcohol use: Yes     Comment: Rarely    Drug use: Never    Sexual activity: Yes     Partners: Male     Birth control/protection: Tubal Ligation   Other Topics Concern    Caffeine Concern No    Exercise No    Seat Belt Yes    Special Diet No    Stress Concern Yes    Weight Concern Yes    Blood Transfusions No   Social History Narrative    Live with  and sons              Review of Systems    Constitutional:  Negative for chills and fever.   HENT:  Negative for sore throat.    Respiratory:  Negative for cough and shortness of breath.    Cardiovascular:  Negative for chest pain.   Gastrointestinal:  Positive for diarrhea. Negative for abdominal pain and vomiting.   Genitourinary:  Negative for dysuria.   Skin:  Negative for rash.   Neurological:  Negative for dizziness and weakness.       Positive for stated complaint: Diarrhea  Other systems are as noted in HPI.  Constitutional and vital signs reviewed.      All other systems reviewed and negative except as noted above.    Physical Exam     ED Triage Vitals [04/09/24 1310]   /50   Pulse 80   Resp 16   Temp 98.9 °F (37.2 °C)   Temp src Temporal   SpO2 100 %   O2 Device None (Room air)       Current:/50   Pulse 80   Temp 98.9 °F (37.2 °C) (Temporal)   Resp 16   LMP 04/07/2024 (Exact Date)   SpO2 100%         Physical Exam  Vitals and nursing note reviewed.   Constitutional:       General: She is not in acute distress.     Appearance: She is not ill-appearing.   Eyes:      Conjunctiva/sclera: Conjunctivae normal.   Pulmonary:      Effort: Pulmonary effort is normal. No respiratory distress.   Abdominal:      Tenderness: There is no abdominal tenderness.   Musculoskeletal:      Cervical back: Normal range of motion and neck supple.   Skin:     General: Skin is warm and dry.   Neurological:      Mental Status: She is alert and oriented to person, place, and time.   Psychiatric:         Behavior: Behavior normal.               ED Course     Labs Reviewed   POCT CBC - Abnormal; Notable for the following components:       Result Value    # Mixed Cells 1.2 (*)     All other components within normal limits   [facility] POCT URINALYSIS DIPSTICK - Abnormal; Notable for the following components:    Urine Color Yamile (*)     Urine Clarity Cloudy (*)     Protein urine 30 (*)     Blood, Urine Large (*)     All other components within normal limits   POCT  ISTAT CHEM8 CARTRIDGE - Abnormal; Notable for the following components:    ISTAT Ionized Calcium 1.00 (*)     ISTAT Creatinine 0.40 (*)     All other components within normal limits   POCT PREGNANCY URINE - Normal   CBC W AUTO DIFF                      MDM      Laboratory results reviewed with patient at bedside prior to disposition.  She will push oral hydration.  She will also reach out to her primary care provider for first available follow-up appointment for further evaluation and treatment as may be indicated.                                 Medical Decision Making  Differential diagnoses considered today include, but are not exclusive of: Irritable bowel syndrome, ulcerative colitis, celiac disease, Crohn's disease, infectious diarrhea (viral, bacterial, parasitic), dehydration, pregnancy.    Problems Addressed:  Diarrhea, unspecified type: undiagnosed new problem with uncertain prognosis        Disposition and Plan     Clinical Impression:  1. Diarrhea, unspecified type    2. Mild dehydration         Disposition:  Discharge  4/9/2024  1:57 pm    Follow-up:  Your primary care provider  Call to schedule appointment to be seen in 5-7 days.              Medications Prescribed:  Discharge Medication List as of 4/9/2024  1:59 PM

## 2024-04-10 LAB
BASOPHILS # BLD AUTO: 0.03 X10(3) UL (ref 0–0.2)
BASOPHILS NFR BLD AUTO: 0.3 %
DEPRECATED RDW RBC AUTO: 49.8 FL (ref 35.1–46.3)
EOSINOPHIL # BLD AUTO: 2.51 X10(3) UL (ref 0–0.7)
EOSINOPHIL NFR BLD AUTO: 24.5 %
ERYTHROCYTE [DISTWIDTH] IN BLOOD BY AUTOMATED COUNT: 14.9 % (ref 11–15)
HCT VFR BLD AUTO: 42.2 %
HGB BLD-MCNC: 13.5 G/DL
IMM GRANULOCYTES # BLD AUTO: 0.03 X10(3) UL (ref 0–1)
IMM GRANULOCYTES NFR BLD: 0.3 %
LYMPHOCYTES # BLD AUTO: 2.4 X10(3) UL (ref 1–4)
LYMPHOCYTES NFR BLD AUTO: 23.4 %
MCH RBC QN AUTO: 29.2 PG (ref 26–34)
MCHC RBC AUTO-ENTMCNC: 32 G/DL (ref 31–37)
MCV RBC AUTO: 91.3 FL
MONOCYTES # BLD AUTO: 0.58 X10(3) UL (ref 0.1–1)
MONOCYTES NFR BLD AUTO: 5.7 %
NEUTROPHILS # BLD AUTO: 4.69 X10 (3) UL (ref 1.5–7.7)
NEUTROPHILS # BLD AUTO: 4.69 X10(3) UL (ref 1.5–7.7)
NEUTROPHILS NFR BLD AUTO: 45.8 %
PLATELET # BLD AUTO: 24 10(3)UL (ref 150–450)
PLATELETS.RETICULATED NFR BLD AUTO: 6.9 % (ref 0–7)
RBC # BLD AUTO: 4.62 X10(6)UL
WBC # BLD AUTO: 10.2 X10(3) UL (ref 4–11)

## 2024-04-19 ENCOUNTER — OFFICE VISIT (OUTPATIENT)
Dept: SLEEP CENTER | Age: 39
End: 2024-04-19
Attending: NURSE PRACTITIONER
Payer: MEDICAID

## 2024-04-19 DIAGNOSIS — E88.819 INSULIN RESISTANCE: ICD-10-CM

## 2024-04-19 DIAGNOSIS — G43.009 MIGRAINE WITHOUT AURA AND WITHOUT STATUS MIGRAINOSUS, NOT INTRACTABLE: ICD-10-CM

## 2024-04-19 DIAGNOSIS — E66.01 CLASS 3 SEVERE OBESITY WITH SERIOUS COMORBIDITY AND BODY MASS INDEX (BMI) OF 40.0 TO 44.9 IN ADULT, UNSPECIFIED OBESITY TYPE (HCC): ICD-10-CM

## 2024-04-19 DIAGNOSIS — R06.83 SNORING: ICD-10-CM

## 2024-04-19 DIAGNOSIS — Z51.81 ENCOUNTER FOR THERAPEUTIC DRUG MONITORING: Primary | ICD-10-CM

## 2024-04-19 DIAGNOSIS — Z86.73 HISTORY OF TIA (TRANSIENT ISCHEMIC ATTACK): ICD-10-CM

## 2024-04-19 DIAGNOSIS — R53.82 CHRONIC FATIGUE: ICD-10-CM

## 2024-04-19 PROCEDURE — 95810 POLYSOM 6/> YRS 4/> PARAM: CPT

## 2024-04-23 ENCOUNTER — ORDER TRANSCRIPTION (OUTPATIENT)
Dept: SLEEP CENTER | Age: 39
End: 2024-04-23

## 2024-04-23 DIAGNOSIS — Z86.73 PERSONAL HISTORY OF TIA (TRANSIENT ISCHEMIC ATTACK): ICD-10-CM

## 2024-04-23 DIAGNOSIS — E66.01 CLASS 3 SEVERE OBESITY DUE TO EXCESS CALORIES WITH SERIOUS COMORBIDITY AND BODY MASS INDEX (BMI) OF 40.0 TO 44.9 IN ADULT (HCC): ICD-10-CM

## 2024-04-23 DIAGNOSIS — E88.819 INSULIN RESISTANCE: ICD-10-CM

## 2024-04-23 DIAGNOSIS — R53.82 CHRONIC FATIGUE: ICD-10-CM

## 2024-04-23 DIAGNOSIS — G43.009 MIGRAINE WITHOUT AURA AND WITHOUT STATUS MIGRAINOSUS, NOT INTRACTABLE: ICD-10-CM

## 2024-04-23 DIAGNOSIS — Z51.81 ENCOUNTER FOR THERAPEUTIC DRUG MONITORING: ICD-10-CM

## 2024-04-23 DIAGNOSIS — R06.83 SNORING: Primary | ICD-10-CM

## 2024-04-26 ENCOUNTER — HOSPITAL ENCOUNTER (OUTPATIENT)
Age: 39
Discharge: ANOTHER HOSPITAL | End: 2024-04-26
Payer: MEDICAID

## 2024-04-26 ENCOUNTER — HOSPITAL ENCOUNTER (EMERGENCY)
Facility: HOSPITAL | Age: 39
Discharge: HOME | End: 2024-04-26
Attending: EMERGENCY MEDICINE
Payer: MEDICAID

## 2024-04-26 VITALS
HEART RATE: 73 BPM | RESPIRATION RATE: 18 BRPM | WEIGHT: 228 LBS | OXYGEN SATURATION: 94 % | TEMPERATURE: 98 F | HEIGHT: 63 IN | DIASTOLIC BLOOD PRESSURE: 60 MMHG | SYSTOLIC BLOOD PRESSURE: 119 MMHG | BODY MASS INDEX: 40.4 KG/M2

## 2024-04-26 VITALS
DIASTOLIC BLOOD PRESSURE: 79 MMHG | OXYGEN SATURATION: 98 % | HEART RATE: 69 BPM | SYSTOLIC BLOOD PRESSURE: 146 MMHG | TEMPERATURE: 98 F | RESPIRATION RATE: 18 BRPM

## 2024-04-26 DIAGNOSIS — R20.2 NUMBNESS AND TINGLING OF UPPER EXTREMITY: ICD-10-CM

## 2024-04-26 DIAGNOSIS — J02.9 SORE THROAT: ICD-10-CM

## 2024-04-26 DIAGNOSIS — R51.9 NONINTRACTABLE HEADACHE, UNSPECIFIED CHRONICITY PATTERN, UNSPECIFIED HEADACHE TYPE: ICD-10-CM

## 2024-04-26 DIAGNOSIS — H57.9 EYE PRESSURE: ICD-10-CM

## 2024-04-26 DIAGNOSIS — H10.32 ACUTE BACTERIAL CONJUNCTIVITIS OF LEFT EYE: Primary | ICD-10-CM

## 2024-04-26 DIAGNOSIS — R51.9 INTRACTABLE HEADACHE, UNSPECIFIED CHRONICITY PATTERN, UNSPECIFIED HEADACHE TYPE: Primary | ICD-10-CM

## 2024-04-26 DIAGNOSIS — R20.0 NUMBNESS AND TINGLING OF UPPER EXTREMITY: ICD-10-CM

## 2024-04-26 LAB
ALBUMIN SERPL-MCNC: 4 G/DL (ref 3.2–4.8)
ALBUMIN/GLOB SERPL: 1.3 {RATIO} (ref 1–2)
ALP LIVER SERPL-CCNC: 113 U/L
ALT SERPL-CCNC: 21 U/L
ANION GAP SERPL CALC-SCNC: 5 MMOL/L (ref 0–18)
AST SERPL-CCNC: 15 U/L (ref ?–34)
BASOPHILS # BLD AUTO: 0.04 X10(3) UL (ref 0–0.2)
BASOPHILS NFR BLD AUTO: 0.4 %
BILIRUB SERPL-MCNC: 0.3 MG/DL (ref 0.3–1.2)
BUN BLD-MCNC: 12 MG/DL (ref 9–23)
BUN/CREAT SERPL: 19.4 (ref 10–20)
CALCIUM BLD-MCNC: 9.1 MG/DL (ref 8.7–10.4)
CHLORIDE SERPL-SCNC: 106 MMOL/L (ref 98–112)
CO2 SERPL-SCNC: 28 MMOL/L (ref 21–32)
CREAT BLD-MCNC: 0.62 MG/DL
DEPRECATED RDW RBC AUTO: 48 FL (ref 35.1–46.3)
EGFRCR SERPLBLD CKD-EPI 2021: 115 ML/MIN/1.73M2 (ref 60–?)
EOSINOPHIL # BLD AUTO: 0.47 X10(3) UL (ref 0–0.7)
EOSINOPHIL NFR BLD AUTO: 4.2 %
ERYTHROCYTE [DISTWIDTH] IN BLOOD BY AUTOMATED COUNT: 14.6 % (ref 11–15)
GLOBULIN PLAS-MCNC: 3.1 G/DL (ref 2.8–4.4)
GLUCOSE BLD-MCNC: 116 MG/DL (ref 70–99)
HCT VFR BLD AUTO: 37.7 %
HGB BLD-MCNC: 12.3 G/DL
IMM GRANULOCYTES # BLD AUTO: 0.04 X10(3) UL (ref 0–1)
IMM GRANULOCYTES NFR BLD: 0.4 %
LYMPHOCYTES # BLD AUTO: 2.31 X10(3) UL (ref 1–4)
LYMPHOCYTES NFR BLD AUTO: 20.8 %
MCH RBC QN AUTO: 29.6 PG (ref 26–34)
MCHC RBC AUTO-ENTMCNC: 32.6 G/DL (ref 31–37)
MCV RBC AUTO: 90.6 FL
MONOCYTES # BLD AUTO: 0.67 X10(3) UL (ref 0.1–1)
MONOCYTES NFR BLD AUTO: 6 %
NEUTROPHILS # BLD AUTO: 7.57 X10 (3) UL (ref 1.5–7.7)
NEUTROPHILS # BLD AUTO: 7.57 X10(3) UL (ref 1.5–7.7)
NEUTROPHILS NFR BLD AUTO: 68.2 %
OSMOLALITY SERPL CALC.SUM OF ELEC: 289 MOSM/KG (ref 275–295)
PLATELET # BLD AUTO: 319 10(3)UL (ref 150–450)
POTASSIUM SERPL-SCNC: 4 MMOL/L (ref 3.5–5.1)
PROT SERPL-MCNC: 7.1 G/DL (ref 5.7–8.2)
RBC # BLD AUTO: 4.16 X10(6)UL
S PYO AG THROAT QL: NEGATIVE
SODIUM SERPL-SCNC: 139 MMOL/L (ref 136–145)
WBC # BLD AUTO: 11.1 X10(3) UL (ref 4–11)

## 2024-04-26 PROCEDURE — 85025 COMPLETE CBC W/AUTO DIFF WBC: CPT | Performed by: EMERGENCY MEDICINE

## 2024-04-26 PROCEDURE — 87880 STREP A ASSAY W/OPTIC: CPT | Performed by: NURSE PRACTITIONER

## 2024-04-26 PROCEDURE — 99284 EMERGENCY DEPT VISIT MOD MDM: CPT

## 2024-04-26 PROCEDURE — 96374 THER/PROPH/DIAG INJ IV PUSH: CPT

## 2024-04-26 PROCEDURE — 93010 ELECTROCARDIOGRAM REPORT: CPT

## 2024-04-26 PROCEDURE — 80053 COMPREHEN METABOLIC PANEL: CPT | Performed by: EMERGENCY MEDICINE

## 2024-04-26 PROCEDURE — 99215 OFFICE O/P EST HI 40 MIN: CPT | Performed by: NURSE PRACTITIONER

## 2024-04-26 PROCEDURE — 93005 ELECTROCARDIOGRAM TRACING: CPT

## 2024-04-26 RX ORDER — ERYTHROMYCIN 5 MG/G
1 OINTMENT OPHTHALMIC EVERY 6 HOURS
Qty: 3.5 G | Refills: 0 | Status: SHIPPED | OUTPATIENT
Start: 2024-04-26 | End: 2024-05-03

## 2024-04-26 RX ORDER — DIPHENHYDRAMINE HYDROCHLORIDE 50 MG/ML
25 INJECTION INTRAMUSCULAR; INTRAVENOUS ONCE
Status: COMPLETED | OUTPATIENT
Start: 2024-04-26 | End: 2024-04-26

## 2024-04-26 NOTE — ED INITIAL ASSESSMENT (HPI)
Patient reports swelling to face, eyelid swelling and heaviness. Patient reports patient's son was dx with pink eye three weeks ago. Patient reports tingling to bilateral arms \"below the elbows\" x 2 days. Patient reports blurriness to eyes that began three days ago. Reports SOB, denies CP.

## 2024-04-26 NOTE — ED PROVIDER NOTES
Patient Seen in: [facility]      History   No chief complaint on file.    Stated Complaint: eye problem    Subjective:   38 y/o female with medical conditions as noted below presents with c/o pressure to top of her head and behind both eyes, bilateral eye tearing onset 3 days ago. 2 days ago developed a sore throat. States yesterday pressure in head and eyes worsened with lightheadedness, developed numbness and tingling to both arms and hands. Took tylenol last night without improvement. Pressure in head and eyes worse this morning. States did develop thick drainage to eyes this morning. Had photophobia and blurred vision. No fever/chills, nasal congestion/runny nose, difficulty swallowing, head injury,  neck pain/pressure, slurred speech, facial tingling and numbness            Objective:   Past Medical History:    ADHD    Essential hypertension    Heart murmur    Hyperlipidemia    Spinal stenosis    TIA (transient ischemic attack)    Urinary incontinence              Past Surgical History:   Procedure Laterality Date    Appendectomy            X2    Cholecystectomy      Evaluate only, bladder (dmg)      Midurethral sling  2022    TVT @ [facility]    Other surgical history      Gallbladder removal & 2 hernia repairs    Tubal ligation      Yr                 Social History     Socioeconomic History    Marital status:    Occupational History    Occupation: [employer]   Tobacco Use    Smoking status: Never    Smokeless tobacco: Never   Vaping Use    Vaping status: Never Used   Substance and Sexual Activity    Alcohol use: Yes     Comment: Rarely    Drug use: Never    Sexual activity: Yes     Partners: Male     Birth control/protection: Tubal Ligation   Other Topics Concern    Caffeine Concern No    Exercise No    Seat Belt Yes    Special Diet No    Stress Concern Yes    Weight Concern Yes    Blood Transfusions No   Social History Narrative    Live with  and sons     Social  Determinants of Health     Food Insecurity: Food Insecurity Present (5/14/2023)    [facility]    Food Insecurity     Currently or in the past 3 months, have you worried your food would run out before you had money to buy more?: Yes     In the past 12 months, have you run out of food or been unable to get more?: No   Transportation Needs: No Transportation Needs (5/14/2023)    [facility]    Transportation Needs     Medical Transportation Needs?: No    [facility] from [facility], [facility]    Social Connections    [facility] from [facility], [facility]    Housing Stability              Review of Systems   Constitutional:  Negative for chills and fever.   HENT:  Positive for sore throat. Negative for congestion, ear discharge and rhinorrhea.    Eyes:  Positive for photophobia, pain, discharge and visual disturbance.   Respiratory:  Negative for cough and shortness of breath.    Gastrointestinal:  Negative for abdominal pain, diarrhea, nausea and vomiting.   Neurological:  Positive for light-headedness, numbness and headaches.   All other systems reviewed and are negative.      Positive for stated complaint: eye problem  Other systems are as noted in HPI.  Constitutional and vital signs reviewed.      All other systems reviewed and negative except as noted above.    Physical Exam     ED Triage Vitals [04/26/24 0930]   /79   Pulse 69   Resp 18   Temp 97.9 °F (36.6 °C)   Temp src Temporal   SpO2 98 %   O2 Device None (Room air)       Current:/79   Pulse 69   Temp 97.9 °F (36.6 °C) (Temporal)   Resp 18   LMP 04/07/2024 (Exact Date)   SpO2 98%         Physical Exam  Vitals and nursing note reviewed.   Constitutional:       General: She is not in acute distress.     Appearance: Normal appearance. She is  not ill-appearing.   HENT:      Head: Normocephalic.      Right Ear: Tympanic membrane and external ear normal.      Left Ear: Tympanic membrane and external ear normal.      Nose: Nose normal. No congestion.      Right Sinus: No maxillary sinus tenderness or frontal sinus tenderness.      Left Sinus: No maxillary sinus tenderness or frontal sinus tenderness.      Mouth/Throat:      Mouth: Mucous membranes are moist.      Pharynx: Oropharynx is clear. Uvula midline.      Tonsils: No tonsillar exudate.   Eyes:      General: Lids are normal.      Extraocular Movements: Extraocular movements intact.      Conjunctiva/sclera:      Right eye: Right conjunctiva is injected.      Left eye: Left conjunctiva is injected.      Pupils: Pupils are equal, round, and reactive to light.      Comments: Small amount of matting to left upper eyelash   Cardiovascular:      Rate and Rhythm: Normal rate and regular rhythm.   Pulmonary:      Effort: Pulmonary effort is normal.      Breath sounds: Normal breath sounds.   Musculoskeletal:         General: Normal range of motion.      Cervical back: Normal range of motion and neck supple.   Skin:     General: Skin is warm.      Capillary Refill: Capillary refill takes less than 2 seconds.   Neurological:      General: No focal deficit present.      Mental Status: She is alert and oriented to person, place, and time.      GCS: GCS eye subscore is 4. GCS verbal subscore is 5. GCS motor subscore is 6.   Psychiatric:         Behavior: Behavior is cooperative.               ED Course     Labs Reviewed   POCT RAPID STREP - Normal                      MDM                                         Medical Decision Making  Patient is non ill/toxic appearing. + photophobia. Complaining of increased pressure in head and behind eyes  I discussed differentials with patient including but not limited to viral uri vs sinusitis vs viral/strep pharyngitis vs TIA vs CVA  Rapid strep negative  Discussed with  patient need for evaluation in ED due to her history of TIA, Migraines. Current symptoms including the tingling in the upper extremities, lightheadedness, worsening headache. Can not rule out neurological etiology in IC  Patient will drive self to [facility]  Discussed with Dr. Figueroa, who agrees with plan      Problems Addressed:  Eye pressure: acute illness or injury  Intractable headache, unspecified chronicity pattern, unspecified headache type: acute illness or injury  Numbness and tingling of upper extremity: acute illness or injury  Sore throat: acute illness or injury    Amount and/or Complexity of Data Reviewed  Labs: ordered. Decision-making details documented in ED Course.        Disposition and Plan     Clinical Impression:  1. Intractable headache, unspecified chronicity pattern, unspecified headache type    2. Eye pressure    3. Numbness and tingling of upper extremity    4. Sore throat         Disposition:  Ic to ed  4/26/2024  9:45 am    Follow-up:  No follow-up provider specified.        Medications Prescribed:  Discharge Medication List as of 4/26/2024  9:55 AM

## 2024-04-26 NOTE — ED INITIAL ASSESSMENT (HPI)
Patient is here with pressure and pain in both eyes along with drainage.  She is also has a sore throat.

## 2024-04-27 NOTE — ED QUICK NOTES
Pt c/o \"allergic reaction\" . Pt itching bilateral arms. MD notified. Airway clear. Pt able to speak in full sentences. No respiratory distress noted.

## 2024-04-27 NOTE — ED PROVIDER NOTES
Patient Seen in: [facility]    History     Chief Complaint   Patient presents with    Tingling    Visual Disturbance       HPI    39-year-old female with a history of hypertension, migraines who is referred to the emergency department from urgent care given that she has mild swelling at her left eyelid, family member recently diagnosed with bacterial conjunctivitis, swelling started 3 days ago, denies any blurry vision or diplopia or any focal weakness or numbness or paresthesias.  Urgent care was also concerned about a mild headache at the mid crown aspect of her head.  She reports low progressive onset, no maximal onset, nonexertional, no neck stiffness or fevers.  Identical to her prior headaches that she has had for several years.  At times over the last several months she has noticed trace paresthesias at her bilateral fingertips however no persistent symptoms.    History reviewed.   Past Medical History:    ADHD    Essential hypertension    Heart murmur    Hyperlipidemia    Spinal stenosis    TIA (transient ischemic attack)    Urinary incontinence       History reviewed.   Past Surgical History:   Procedure Laterality Date    Appendectomy            X2    Cholecystectomy      Evaluate only, bladder (dmg)      Midurethral sling  2022    TVT @ [facility]    Other surgical history      Gallbladder removal & 2 hernia repairs    Tubal ligation      Yr          Medications :  (Not in a hospital admission)       Family History   Problem Relation Age of Onset    Ovarian Cancer Mother 28    Hypertension Mother     Other (cervical cancer) Mother     Diabetes Father     Hypertension Father     Breast Cancer Maternal Aunt 60       Smoking Status:   Social History     Socioeconomic History    Marital status:    Occupational History    Occupation: [employer]   Tobacco Use    Smoking status: Never    Smokeless tobacco: Never   Vaping Use    Vaping status: Never Used   Substance and  Sexual Activity    Alcohol use: Yes     Comment: Rarely    Drug use: Never    Sexual activity: Yes     Partners: Male     Birth control/protection: Tubal Ligation   Other Topics Concern    Caffeine Concern No    Exercise No    Seat Belt Yes    Special Diet No    Stress Concern Yes    Weight Concern Yes    Blood Transfusions No       Constitutional and vital signs reviewed.      Social History and Family History elements reviewed from today, pertinent positives to the presenting problem noted.    Physical Exam     ED Triage Vitals   BP 04/26/24 1851 (!) 138/93   Pulse 04/26/24 1851 84   Resp 04/26/24 1851 20   Temp 04/26/24 1851 98.4 °F (36.9 °C)   Temp src 04/26/24 1851 Temporal   SpO2 04/26/24 1851 98 %   O2 Device 04/26/24 1945 None (Room air)       All measures to prevent infection transmission during my interaction with the patient were taken. The patient was already wearing a droplet mask on my arrival to the room. Personal protective equipment was worn throughout the duration of the exam.  Handwashing was performed prior to and after the exam.  Stethoscope and any equipment used during my examination was cleaned with super sani-cloth germicidal wipes following the exam.     Physical Exam    General: NAD  Head: Normocephalic and atraumatic.  Mouth/Throat/Ears/Nose: Oropharynx is clear and moist.   Eyes:  Conjunctival injection of L eye, bilateral EOM are normal. Pupils are equal, round, and reactive to light.   VA: OU 20/40   Mild swelling of L eyelid  IOP: 13 both eyes     Neck: Normal range of motion. Supple.   Cardiovascular: Normal rate, regular rhythm, normal heart sounds.  Respiratory/Chest: Clear and equal bilaterally. Exhibits no tenderness.  Gastrointestinal: Soft, non-tender, non-distended. Bowel sounds are normal.   Musculoskeletal:No swelling or deformity.   Neurological: Alert and appropriate. No focal deficits. AOx4  CN II-XII grossly intact  5/5 strength: Left  strength, deltoid abduction,  achilles, patella  5/5 strength: Right  strength, deltoid abduction, achilles, patella   SILT to bilateral upper and lower extremities   normal gait . Normal FTN, HTS, no dysdiadochokinesis.     Skin: Skin is warm and dry. No pallor.  Psychiatric: Has a normal mood and affect.      ED Course        Labs Reviewed   COMP METABOLIC PANEL (14) - Abnormal; Notable for the following components:       Result Value    Glucose 116 (*)     Alkaline Phosphatase 113 (*)     All other components within normal limits   CBC W/ DIFFERENTIAL - Abnormal; Notable for the following components:    WBC 11.1 (*)     RDW-SD 48.0 (*)     All other components within normal limits   CBC WITH DIFFERENTIAL WITH PLATELET    Narrative:     The following orders were created for panel order CBC With Differential With Platelet.                  Procedure                               Abnormality         Status                                     ---------                               -----------         ------                                     CBC W/ DIFFERENTIAL[[id number]]          Abnormal            Final result                                                 Please view results for these tests on the individual orders.   RAINBOW DRAW BLUE     EKG    Rate, intervals and axes as noted on EKG Report.  Rate: 88  Rhythm: Sinus Rhythm  Reading: No STEMI.  This is my interpretation.           As Interpreted by me    Imaging Results Available and Reviewed while in ED: No results found.  ED Medications Administered:   Medications   diphenhydrAMINE (Benadryl) 50 mg/mL  injection 25 mg (25 mg Intravenous Given 4/26/24 2013)         MDM     Vitals:    04/26/24 1945 04/26/24 2000 04/26/24 2015 04/26/24 2030   BP: 106/63 102/71 125/76 119/60   Pulse: 69 75 69 73   Resp: 19 18 17 18   Temp:       TempSrc:       SpO2: 97% 96% 97% 94%   Weight:       Height:         *I personally reviewed and interpreted all ED vitals.    Pulse Ox: 97%, Room air, Normal      Monitor Interpretation:   normal sinus rhythm as interpreted by me.  The cardiac monitor was ordered given concern for electrolyte derangement.      Medical Decision Making      Differential Diagnosis/ Diagnostic Considerations: Migraine, intracranial hemorrhage, meningitis, electrolyte derangements, bacterial conjunctivitis    Complicating Factors: The patient already has does not have any pertinent problems on file. to contribute to the complexity of this ED evaluation.    I reviewed prior chart records including urgent care visit note from earlier today prompting referral to the emergency department.  Patient is here with left bacterial conjunctivitis, antibiotics prescribed.  Headache inconsistent with meningitis or intracranial hemorrhage or subarachnoid hemorrhage.  No meningismus.  Well-appearing.  Labs reviewed and unremarkable for acute findings on my interpretation.  No clinical evidence of CVA, no neurologic deficits.    Discharged in stable condition.  Patient is comfortable with the plan.      Disposition and Plan     Clinical Impression:  1. Acute bacterial conjunctivitis of left eye    2. Nonintractable headache, unspecified chronicity pattern, unspecified headache type        Disposition:  Discharge    Follow-up:  Nonstaff, Physician    Follow up      Judy Zafar MD  [address] [place] [address]  [id number]  [place] [address]  [phone]    Schedule an appointment as soon as possible for a visit in 1 day(s)        Medications Prescribed:  Discharge Medication List as of 4/26/2024  8:37 PM        START taking these medications    Details   erythromycin 5 MG/GM Ophthalmic Ointment Place 1 Application into the left eye every 6 (six) hours for 7 days., Normal, Disp-3.5 g, R-0

## 2024-04-28 LAB
ATRIAL RATE: 88 BPM
P AXIS: 42 DEGREES
P-R INTERVAL: 136 MS
Q-T INTERVAL: 372 MS
QRS DURATION: 82 MS
QTC CALCULATION (BEZET): 450 MS
R AXIS: 9 DEGREES
T AXIS: 27 DEGREES
VENTRICULAR RATE: 88 BPM

## 2024-05-03 ENCOUNTER — OFFICE VISIT (OUTPATIENT)
Dept: INTEGRATIVE MEDICINE | Facility: CLINIC | Age: 39
End: 2024-05-03
Payer: MEDICAID

## 2024-05-03 ENCOUNTER — HOSPITAL ENCOUNTER (OUTPATIENT)
Age: 39
Discharge: HOME | End: 2024-05-03
Payer: MEDICAID

## 2024-05-03 VITALS
WEIGHT: 242 LBS | HEART RATE: 71 BPM | DIASTOLIC BLOOD PRESSURE: 80 MMHG | OXYGEN SATURATION: 95 % | HEIGHT: 63 IN | BODY MASS INDEX: 42.88 KG/M2 | SYSTOLIC BLOOD PRESSURE: 110 MMHG

## 2024-05-03 VITALS
SYSTOLIC BLOOD PRESSURE: 146 MMHG | RESPIRATION RATE: 18 BRPM | TEMPERATURE: 97 F | DIASTOLIC BLOOD PRESSURE: 74 MMHG | HEART RATE: 80 BPM | OXYGEN SATURATION: 99 %

## 2024-05-03 DIAGNOSIS — E88.819 INSULIN RESISTANCE: Primary | ICD-10-CM

## 2024-05-03 DIAGNOSIS — J02.9 SORE THROAT: ICD-10-CM

## 2024-05-03 DIAGNOSIS — R05.9 COUGH, UNSPECIFIED TYPE: Primary | ICD-10-CM

## 2024-05-03 DIAGNOSIS — E78.1 ABNORMALLY LOW HIGH DENSITY LIPOPROTEIN (HDL) CHOLESTEROL WITH HYPERTRIGLYCERIDEMIA: ICD-10-CM

## 2024-05-03 DIAGNOSIS — M54.42 CHRONIC BILATERAL LOW BACK PAIN WITH BILATERAL SCIATICA: ICD-10-CM

## 2024-05-03 DIAGNOSIS — G89.29 CHRONIC BILATERAL LOW BACK PAIN WITH BILATERAL SCIATICA: ICD-10-CM

## 2024-05-03 DIAGNOSIS — M54.41 CHRONIC BILATERAL LOW BACK PAIN WITH BILATERAL SCIATICA: ICD-10-CM

## 2024-05-03 DIAGNOSIS — E78.6 ABNORMALLY LOW HIGH DENSITY LIPOPROTEIN (HDL) CHOLESTEROL WITH HYPERTRIGLYCERIDEMIA: ICD-10-CM

## 2024-05-03 DIAGNOSIS — R73.09 ELEVATED GLUCOSE: ICD-10-CM

## 2024-05-03 LAB — S PYO AG THROAT QL: NEGATIVE

## 2024-05-03 PROCEDURE — 99213 OFFICE O/P EST LOW 20 MIN: CPT | Performed by: NURSE PRACTITIONER

## 2024-05-03 PROCEDURE — 87880 STREP A ASSAY W/OPTIC: CPT | Performed by: NURSE PRACTITIONER

## 2024-05-03 RX ORDER — DULAGLUTIDE 0.75 MG/.5ML
0.75 INJECTION, SOLUTION SUBCUTANEOUS WEEKLY
Qty: 6 ML | Refills: 0 | Status: SHIPPED | OUTPATIENT
Start: 2024-05-03 | End: 2024-08-01

## 2024-05-03 NOTE — ED INITIAL ASSESSMENT (HPI)
Pt c/o sore throat and cough x4 weeks.  Son positive for strep at IC today.  States fever 2 weeks ago.  States neg strep test 4 weeks ago at a clinic in [place].

## 2024-05-03 NOTE — DISCHARGE INSTRUCTIONS
You may try over-the-counter allergy medication such as Zyrtec and Flonase as you may be getting postnasal drip that may be contributing to the sore throat and cough.  Drink plenty of fluids warm tea with honey you may take Tylenol for pain or discomfort.  Follow-up with your primary care provider for reevaluation make sure you follow-up with the provider they want you to follow-up with for your sleep study results.  If you develop neck swelling stiffness fever nausea or vomiting or any new or worsening symptoms go to the nearest emergency department.
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
## 2024-05-03 NOTE — ED PROVIDER NOTES
Patient Seen in: [facility]      History     Chief Complaint   Patient presents with    Sore Throat     Stated Complaint: Sore Throat,Body Ache    Subjective:   HPI    This is a 39-year-old female with history of ADHD essential hypertension hyperlipidemia TIA presenting with a cough and sore throat.  Patient states for about 4 weeks she has had occasional cough and a sore throat.  Patient states she had a negative strep done 4 weeks ago but her son had strep a few weeks ago and then just tested positive today so is concerned that she may have strep throat.  Patient states she recently had a sleep study and was informed that she does have sleep apnea which she is going to follow-up with.  Denies any nausea vomiting diarrhea chest pain shortness of breath or fever.    Objective:   Past Medical History:    ADHD    Essential hypertension    Heart murmur    Hyperlipidemia    Spinal stenosis    TIA (transient ischemic attack)    Urinary incontinence              Past Surgical History:   Procedure Laterality Date    Appendectomy            X2    Cholecystectomy      Evaluate only, bladder (dmg)      Midurethral sling  2022    TVT @ [facility]    Other surgical history      Gallbladder removal & 2 hernia repairs    Tubal ligation      2015                Social History     Socioeconomic History    Marital status:    Occupational History    Occupation: [employer]   Tobacco Use    Smoking status: Never    Smokeless tobacco: Never   Vaping Use    Vaping status: Never Used   Substance and Sexual Activity    Alcohol use: Yes     Comment: Rarely    Drug use: Never    Sexual activity: Yes     Partners: Male     Birth control/protection: Tubal Ligation   Other Topics Concern    Caffeine Concern No    Exercise No    Seat Belt Yes    Special Diet No    Stress Concern Yes    Weight Concern Yes    Blood Transfusions No   Social History Narrative    Live with  and sons     Social Determinants of Health      Food Insecurity: Food Insecurity Present (5/14/2023)    Received from [facility], [facility]    Food Insecurity     Currently or in the past 3 months, have you worried your food would run out before you had money to buy more?: Yes     In the past 12 months, have you run out of food or been unable to get more?: No   Transportation Needs: No Transportation Needs (5/14/2023)    Received from [facility], [facility]    Transportation Needs     Medical Transportation Needs?: No    Received from [facility], [facility]    Social Connections    Received from [facility], [facility]    Housing Stability              Review of Systems    Positive for stated complaint: Sore Throat,Body Ache  Other systems are as noted in HPI.  Constitutional and vital signs reviewed.      All other systems reviewed and negative except as noted above.    Physical Exam     ED Triage Vitals [05/03/24 1507]   /74   Pulse 80   Resp 18   Temp 97 °F (36.1 °C)   Temp src Temporal   SpO2 99 %   O2 Device None (Room air)       Current:/74   Pulse 80   Temp 97 °F (36.1 °C) (Temporal)   Resp 18   LMP 04/07/2024 (Exact Date)   SpO2 99%         Physical Exam  Vitals and nursing note reviewed.   Constitutional:       Appearance: Normal appearance.   HENT:      Right Ear: Tympanic membrane normal.      Left Ear: Tympanic membrane normal.      Nose: Nose normal. No congestion or rhinorrhea.      Mouth/Throat:      Mouth: Mucous membranes are moist.      Pharynx: Oropharynx is clear. No posterior oropharyngeal erythema.      Tonsils: No tonsillar exudate or tonsillar abscesses. 0 on the right. 0 on the left.   Eyes:      Conjunctiva/sclera: Conjunctivae normal.   Cardiovascular:      Rate and Rhythm: Normal rate.   Pulmonary:      Effort: Pulmonary effort is normal. No respiratory  distress.      Breath sounds: Normal breath sounds. No wheezing.      Comments: Occasional dry cough  Musculoskeletal:         General: Normal range of motion.      Cervical back: Normal range of motion. No rigidity or tenderness.   Lymphadenopathy:      Cervical: No cervical adenopathy.   Skin:     General: Skin is warm.      Capillary Refill: Capillary refill takes less than 2 seconds.   Neurological:      General: No focal deficit present.      Mental Status: She is alert and oriented to person, place, and time.               ED Course     Labs Reviewed   POCT RAPID STREP - Normal                      MDM             Medical Decision Making  39-year-old female nontoxic-appearing afebrile no hypoxia or distress occasional cough during exam with a sore throat and cough for weeks recently exposed to strep from her son.  No clinical indication for labs or imaging but she will be swabbed for strep.    Strep negative patient made aware results.  Discussed with the patient symptoms that she is experiencing may be related to sleep apnea so is important that she follows up with a provider regarding this.  Discussed symptoms may could be related to allergies may try over-the-counter antihistamine such as Zyrtec and Flonase nasal spray as she may be getting postnasal drip.  Discussed over-the-counter Tylenol throat lozenges and supportive therapy for symptoms.  All education and instructions including ER precautions placed in discharge paperwork.  Patient acknowledged understanding discharge instructions.    Problems Addressed:  Cough, unspecified type: acute illness or injury  Sore throat: acute illness or injury    Amount and/or Complexity of Data Reviewed  Labs:  Decision-making details documented in ED Course.    Risk  OTC drugs.        Disposition and Plan     Clinical Impression:  1. Cough, unspecified type    2. Sore throat         Disposition:  Discharge  5/3/2024  3:24 pm    Follow-up:  Nonstaff,  Physician    Schedule an appointment as soon as possible for a visit in 3 days            Medications Prescribed:  Current Discharge Medication List

## 2024-05-06 ENCOUNTER — PATIENT MESSAGE (OUTPATIENT)
Dept: NEUROLOGY | Facility: CLINIC | Age: 39
End: 2024-05-06

## 2024-05-06 DIAGNOSIS — E23.6 EMPTY SELLA (HCC): ICD-10-CM

## 2024-05-06 DIAGNOSIS — G43.009 MIGRAINE WITHOUT AURA AND WITHOUT STATUS MIGRAINOSUS, NOT INTRACTABLE: Primary | ICD-10-CM

## 2024-05-06 DIAGNOSIS — G81.94 LEFT HEMIPARESIS (HCC): ICD-10-CM

## 2024-05-07 ENCOUNTER — HOSPITAL ENCOUNTER (OUTPATIENT)
Dept: MRI IMAGING | Facility: HOSPITAL | Age: 39
Discharge: HOME | End: 2024-05-07
Attending: Other
Payer: MEDICAID

## 2024-05-07 DIAGNOSIS — G81.94 LEFT HEMIPARESIS (HCC): ICD-10-CM

## 2024-05-07 DIAGNOSIS — E23.6 EMPTY SELLA (HCC): ICD-10-CM

## 2024-05-07 PROCEDURE — A9575 INJ GADOTERATE MEGLUMI 0.1ML: HCPCS | Performed by: OTHER

## 2024-05-07 PROCEDURE — 70553 MRI BRAIN STEM W/O & W/DYE: CPT | Performed by: OTHER

## 2024-05-07 RX ORDER — GADOTERATE MEGLUMINE 376.9 MG/ML
20 INJECTION INTRAVENOUS
Status: COMPLETED | OUTPATIENT
Start: 2024-05-07 | End: 2024-05-07

## 2024-05-07 RX ORDER — DIAZEPAM 10 MG/1
TABLET ORAL
Qty: 2 TABLET | Refills: 0 | Status: SHIPPED | OUTPATIENT
Start: 2024-05-07

## 2024-05-07 RX ADMIN — GADOTERATE MEGLUMINE 20 ML: 376.9 INJECTION INTRAVENOUS at 16:58:00

## 2024-05-07 NOTE — TELEPHONE ENCOUNTER
From: Angelo Steinberg  To: Choco Garcia  Sent: 5/6/2024 8:49 PM CDT  Subject: MRI    Hello Doctor. Would you please be able to prescribe a sedation medication for tomorrow's MRI. I am very anxious. My appointment will be at 4pm. Thanks so much.

## 2024-05-07 NOTE — TELEPHONE ENCOUNTER
Dr. Garcia, please review and advise.  MRI is scheduled for tomorrow, 5/8/24.  Thanks.  Reviewed and electronically signed by: DAKOTA Alegria

## 2024-05-31 ENCOUNTER — PATIENT MESSAGE (OUTPATIENT)
Dept: INTEGRATIVE MEDICINE | Facility: CLINIC | Age: 39
End: 2024-05-31

## 2024-05-31 RX ORDER — DULAGLUTIDE 1.5 MG/.5ML
1.5 INJECTION, SOLUTION SUBCUTANEOUS
Qty: 6 ML | Refills: 0 | OUTPATIENT
Start: 2024-05-31

## 2024-05-31 RX ORDER — DULAGLUTIDE 1.5 MG/.5ML
1.5 INJECTION, SOLUTION SUBCUTANEOUS WEEKLY
Qty: 6 ML | Refills: 0 | Status: SHIPPED | OUTPATIENT
Start: 2024-05-31 | End: 2024-08-29

## 2024-05-31 NOTE — TELEPHONE ENCOUNTER
From: Angelo Steinberg  To: Floresita Ray  Sent: 5/31/2024 11:31 AM CDT  Subject: Follow up on Trulicity shots    Hello. I would like to share with you that am feeling great with trulicity shots. As per our last appointment, you had mentioned that you could up the dosage if the .75 shots didn't cause major symptoms. Would you be able to send the updated dosage to my pharmacy soon? Yesterday I took my last shot. I don't have any more for next week. Also been feeling much better and I wanted to share this with you. Please keep me posted. Have a great day/week.

## 2024-05-31 NOTE — TELEPHONE ENCOUNTER
From: Angelo Steinberg  To: Floresita Cory  Sent: 5/31/2024 11:49 AM CDT  Subject: Medicine contradictions!    I forgot to mention in my previous message that I came to the Dentist because I am having a lot of tooth pain. I will have a procedure done next week and they will use the nitro gas so I won't feel pain while they work on me. Also they prescribed Amoxicillin for now 500 MG and Ibuprofen and Tylenol to manage the pain and the infection. Will these medications or the nitro gas interfere with metformin and the Trulicity shots? Just making sure. Thanks for the guidance and your time.

## 2024-05-31 NOTE — TELEPHONE ENCOUNTER
A refill request was received for:  Requested Prescriptions     Pending Prescriptions Disp Refills    Dulaglutide (TRULICITY) 1.5 MG/0.5ML Subcutaneous Solution Pen-injector 2 mL 0     Sig: Inject 1.5 mg into the skin once a week.     Last refill date:  5/3/2024  Qty: 6 mL and 0  Dx:   insulin resistance      Last office visit: 5/3/2024  When is follow up due: 7/26/2024    No future appointments.

## 2024-06-01 ENCOUNTER — HOSPITAL ENCOUNTER (EMERGENCY)
Facility: HOSPITAL | Age: 39
Discharge: HOME | End: 2024-06-01
Attending: EMERGENCY MEDICINE
Payer: MEDICAID

## 2024-06-01 VITALS
RESPIRATION RATE: 16 BRPM | WEIGHT: 220 LBS | BODY MASS INDEX: 38.98 KG/M2 | HEIGHT: 63 IN | SYSTOLIC BLOOD PRESSURE: 118 MMHG | OXYGEN SATURATION: 99 % | TEMPERATURE: 99 F | DIASTOLIC BLOOD PRESSURE: 78 MMHG | HEART RATE: 74 BPM

## 2024-06-01 DIAGNOSIS — K08.89 PAIN, DENTAL: Primary | ICD-10-CM

## 2024-06-01 PROCEDURE — 99283 EMERGENCY DEPT VISIT LOW MDM: CPT

## 2024-06-01 PROCEDURE — 99284 EMERGENCY DEPT VISIT MOD MDM: CPT

## 2024-06-01 PROCEDURE — 96372 THER/PROPH/DIAG INJ SC/IM: CPT

## 2024-06-01 RX ORDER — LIDOCAINE HCL/EPINEPHRINE/PF 2%-1:200K
VIAL (ML) INJECTION
Status: DISCONTINUED
Start: 2024-06-01 | End: 2024-06-01

## 2024-06-01 RX ORDER — LIDOCAINE HCL/EPINEPHRINE/PF 2%-1:200K
20 VIAL (ML) INJECTION ONCE
Status: DISCONTINUED | OUTPATIENT
Start: 2024-06-01 | End: 2024-06-01

## 2024-06-01 RX ORDER — KETOROLAC TROMETHAMINE 10 MG/1
10 TABLET, FILM COATED ORAL EVERY 6 HOURS PRN
Qty: 15 TABLET | Refills: 0 | Status: SHIPPED | OUTPATIENT
Start: 2024-06-01 | End: 2024-06-08

## 2024-06-01 RX ORDER — KETOROLAC TROMETHAMINE 30 MG/ML
30 INJECTION, SOLUTION INTRAMUSCULAR; INTRAVENOUS ONCE
Status: COMPLETED | OUTPATIENT
Start: 2024-06-01 | End: 2024-06-01

## 2024-06-01 RX ORDER — ALPRAZOLAM 0.25 MG/1
0.25 TABLET ORAL ONCE
Status: COMPLETED | OUTPATIENT
Start: 2024-06-01 | End: 2024-06-01

## 2024-06-01 RX ORDER — ACETAMINOPHEN 500 MG
1000 TABLET ORAL EVERY 6 HOURS PRN
COMMUNITY

## 2024-06-01 RX ORDER — AMOXICILLIN 500 MG/1
500 CAPSULE ORAL 3 TIMES DAILY
COMMUNITY

## 2024-06-01 RX ORDER — IBUPROFEN 800 MG/1
800 TABLET ORAL EVERY 6 HOURS PRN
COMMUNITY

## 2024-06-01 NOTE — ED INITIAL ASSESSMENT (HPI)
Pt presents with c/o right sided facial pain.  She states that she has been having the pain for 2 weeks and was told yesterday that she has an infected wisdom tooth and was started on antibiotics and pain medication.  Pt last took pain medication at 455 am, Ibuprofen 800 mg. Pt took 1 gram Tylenol at 100 am. Pt has taken one dose of her antibiotic.

## 2024-06-02 NOTE — ED PROVIDER NOTES
Patient Seen in: [facility] Emergency Department    History     Chief Complaint   Patient presents with    Dental Problem     Stated Complaint: Tooth infection    HPI    Angelo Steinberg is a 39 year old female who presents for evaluation and management of a chief complaint of dental pain. Onset days ago, seen dentist given abx but having increased pain.   Patient describes pain as  7/10. Located r upper post molar. The pain is worse with chewing.   Patient denies jaw swelling or fever.      Past Medical History:    ADHD    Essential hypertension    Heart murmur    Hyperlipidemia    Insulin resistance    Spinal stenosis    TIA (transient ischemic attack)    Urinary incontinence       Past Surgical History:   Procedure Laterality Date    Appendectomy            X2    Cholecystectomy      Evaluate only, bladder (dmg)      Midurethral sling  2022    TVT @ [facility]    Other surgical history      Gallbladder removal & 2 hernia repairs    Repair ing hernia,5+y/o,reducibl      Tubal ligation      Yr             Family History   Problem Relation Age of Onset    Ovarian Cancer Mother 28    Hypertension Mother     Other (cervical cancer) Mother     Diabetes Father     Hypertension Father     Breast Cancer Maternal Aunt 60       Social History     Socioeconomic History    Marital status:    Occupational History    Occupation: [employer]   Tobacco Use    Smoking status: Never    Smokeless tobacco: Never   Vaping Use    Vaping status: Never Used   Substance and Sexual Activity    Alcohol use: Not Currently     Comment: Rarely    Drug use: Never    Sexual activity: Yes     Partners: Male     Birth control/protection: Tubal Ligation   Other Topics Concern    Caffeine Concern No    Exercise No    Seat Belt Yes    Special Diet No    Stress Concern Yes    Weight Concern Yes    Blood Transfusions No   Social History Narrative    Live with  and sons     Social Determinants of Health     Food Insecurity:  Food Insecurity Present (5/14/2023)    Received from [facility], [facility]    Food Insecurity     Currently or in the past 3 months, have you worried your food would run out before you had money to buy more?: Yes     In the past 12 months, have you run out of food or been unable to get more?: No   Transportation Needs: No Transportation Needs (5/14/2023)    Received from [facility], [facility]    Transportation Needs     Medical Transportation Needs?: No    Received from [facility], [facility]    Social Connections    Received from [facility], [facility]    Housing Stability       Review of Systems    Positive for stated complaint: Tooth infection  Other systems are as noted in HPI.  Constitutional and vital signs reviewed.      All other systems reviewed and negative except as noted above.    PSFH elements reviewed from today and agreed except as otherwise stated in HPI.    Physical Exam     ED Triage Vitals [06/01/24 0656]   /83   Pulse 73   Resp 18   Temp 98.5 °F (36.9 °C)   Temp src Oral   SpO2 99 %   O2 Device None (Room air)       Current:/78   Pulse 74   Temp 98.5 °F (36.9 °C) (Oral)   Resp 16   Ht 160 cm (5' 3\")   Wt 99.8 kg   LMP 05/11/2024 (Exact Date)   SpO2 99%   BMI 38.97 kg/m²   General Appearance: alert, no distress, no stridor  Eyes: pupils equal and round no pallor or injection  ENT, Mouth: mucous membranes moist, r max molar with necrotic decayed center tender to percussion, no surrounding erythema or fluctuance   Neck: supple, no meningeal signs, no ant cervical adenopathy  Respiratory: no resp distress, no stridor,  Neurological:II-XII grossly intact,  no focal deficits  Skin: warm and dry, no rashes.  Musculoskeletal:  Extremities are symmetrical, full range of motion        Physical Exam          ED Course   Labs  Reviewed - No data to display    PROCEDURE:    [facility]     Medical Decision Making  Offered patient dental block refused, gave I'm toradol and referral to dental clinic    Problems Addressed:  Pain, dental: acute illness or injury    Amount and/or Complexity of Data Reviewed  Discussion of management or test interpretation with external provider(s): Tylenol, motrin recommended.      Risk  OTC drugs.  Prescription drug management.            Presentation consistent with dental pain. No evidence of Rajeev's Angina, large abscess pocket, requirement for emergent extraction, or other complications. Provided prescription for analgesics and antibiotics. Patient informed to follow up with local dentist. Return to ER if pain uncontrolled, abscess that drains purulent fluid, high fevers, trouble swallowing, or other concerns.    Disposition and Plan     Clinical Impression:  1. Pain, dental        Disposition:  Discharge    Follow-up:  Nonstaff, Physician    Follow up      [facility] [address] [address]  [phone]  Follow up        Medications Prescribed:  Discharge Medication List as of 6/1/2024  7:48 AM        START taking these medications    Details   Ketorolac Tromethamine 10 MG Oral Tab Take 1 tablet (10 mg total) by mouth every 6 (six) hours as needed for Pain., Normal, Disp-15 tablet, R-0

## 2024-06-10 ENCOUNTER — MED REC SCAN ONLY (OUTPATIENT)
Dept: INTERNAL MEDICINE CLINIC | Facility: CLINIC | Age: 39
End: 2024-06-10

## 2024-07-03 RX ORDER — DULAGLUTIDE 1.5 MG/.5ML
1.5 INJECTION, SOLUTION SUBCUTANEOUS
Qty: 6 ML | Refills: 0 | OUTPATIENT
Start: 2024-07-03

## 2024-07-03 NOTE — TELEPHONE ENCOUNTER
Spoke with patient. Advised that we are waiting on prior authorization from insurance for medication.

## 2024-07-15 NOTE — TELEPHONE ENCOUNTER
Prior authorization for Trulicity 1.5mg Pen injector denied. Denial reason states Patient must have type 2 diabetes for coverage.

## 2024-07-16 ENCOUNTER — PATIENT MESSAGE (OUTPATIENT)
Dept: INTEGRATIVE MEDICINE | Facility: CLINIC | Age: 39
End: 2024-07-16

## 2024-07-16 ENCOUNTER — NURSE TRIAGE (OUTPATIENT)
Dept: INTEGRATIVE MEDICINE | Facility: CLINIC | Age: 39
End: 2024-07-16

## 2024-07-16 NOTE — TELEPHONE ENCOUNTER
RN spoke with patient.     Patient complains of bilateral arm numbness, excessive thirst, and frequent urination. Patient says that sometimes when she is driving, \"her vision gets blurry\" and  \"it is scary sometimes.\" Worsens after she eats certain foods. Has hx of TIA in 2018, states \"she is not having that' symptoms come and go.     Patient is currently on metformin. Would like alternative to Trulicity.  Patient had to stop Trulicity due to insurance denial. Patient says she is sad because she felt great on Trulicity.    Patient advised to proceed to the ED/[facility] for evaluation of symptoms. Advised patient that message would be sent to SEAN Canas for review/plan.     Patient says that she is at work currently, \"may not be able to go?\"      re emphasized to patient  that she should be evaluated at the [facility]/ED.     Message forwarded to  SEAN Canas for review.             Reason for Disposition   Neurologic deficit of gradual onset (e.g., days to weeks), ANY of the following: * Weakness of the face, arm, or leg on one side of the body* Numbness of the face, arm, or leg on one side of the body* Loss of speech or garbled speech    Protocols used: Neurologic Deficit-A-OH     no

## 2024-07-23 ENCOUNTER — TELEPHONE (OUTPATIENT)
Dept: PEDIATRICS CLINIC | Facility: CLINIC | Age: 39
End: 2024-07-23

## 2024-07-23 NOTE — TELEPHONE ENCOUNTER
error   Hydroxychloroquine Counseling:  I discussed with the patient that a baseline ophthalmologic exam is needed at the start of therapy and every year thereafter while on therapy. A CBC may also be warranted for monitoring.  The side effects of this medication were discussed with the patient, including but not limited to agranulocytosis, aplastic anemia, seizures, rashes, retinopathy, and liver toxicity. Patient instructed to call the office should any adverse effect occur.  The patient verbalized understanding of the proper use and possible adverse effects of Plaquenil.  All the patient's questions and concerns were addressed.

## 2024-08-01 RX ORDER — DULAGLUTIDE 1.5 MG/.5ML
1.5 INJECTION, SOLUTION SUBCUTANEOUS WEEKLY
Qty: 6 ML | Refills: 0 | Status: SHIPPED | OUTPATIENT
Start: 2024-08-01 | End: 2024-10-30

## 2024-08-05 ENCOUNTER — PATIENT MESSAGE (OUTPATIENT)
Dept: INTEGRATIVE MEDICINE | Facility: CLINIC | Age: 39
End: 2024-08-05

## 2024-08-05 DIAGNOSIS — E78.1 ABNORMALLY LOW HIGH DENSITY LIPOPROTEIN (HDL) CHOLESTEROL WITH HYPERTRIGLYCERIDEMIA: ICD-10-CM

## 2024-08-05 DIAGNOSIS — L67.8 ABNORMAL FACIAL HAIR: ICD-10-CM

## 2024-08-05 DIAGNOSIS — E88.819 INSULIN RESISTANCE: Primary | ICD-10-CM

## 2024-08-05 DIAGNOSIS — R73.09 ELEVATED GLUCOSE: ICD-10-CM

## 2024-08-05 DIAGNOSIS — E78.6 ABNORMALLY LOW HIGH DENSITY LIPOPROTEIN (HDL) CHOLESTEROL WITH HYPERTRIGLYCERIDEMIA: ICD-10-CM

## 2024-08-07 ENCOUNTER — PATIENT MESSAGE (OUTPATIENT)
Dept: OBGYN CLINIC | Facility: CLINIC | Age: 39
End: 2024-08-07

## 2024-08-07 NOTE — TELEPHONE ENCOUNTER
From: Angelo Steinberg  To: Mari Soni  Sent: 8/7/2024 11:59 AM CDT  Subject: UTI    Hello  Hope you doing well. I am in desperate need. I had diarrhea for 3 days and after that I started feeling UTI symptoms (started 2 days ago) am pretty confident it was because of me not been able to make it to the bathroom a couple of times and well you know I had my private area exposed to poop. Sorry to much info. I feel very discomfort when I urinate, it burns a little too. Am urinating very frequently and it feels like I can't fully empty my bladder. Same symptoms I always have when I experience an UTI. Yesterday I had a low fever and chills since 2 days ago. Can you please send something to my pharmacy for me? The [facility] on [address] and [address]. Please let me know. Thanks so much

## 2024-08-09 RX ORDER — ORAL SEMAGLUTIDE 3 MG/1
3 TABLET ORAL DAILY
Qty: 90 TABLET | Refills: 0 | Status: SHIPPED | OUTPATIENT
Start: 2024-08-09 | End: 2024-11-07

## 2024-08-27 ENCOUNTER — TELEPHONE (OUTPATIENT)
Dept: NEUROLOGY | Facility: CLINIC | Age: 39
End: 2024-08-27

## 2024-08-27 RX ORDER — TOPIRAMATE 25 MG/1
100 TABLET, FILM COATED ORAL 2 TIMES DAILY
Qty: 60 TABLET | Refills: 0 | Status: SHIPPED | OUTPATIENT
Start: 2024-08-27

## 2024-08-28 RX ORDER — TOPIRAMATE 25 MG/1
TABLET, FILM COATED ORAL
Qty: 90 TABLET | Refills: 5 | Status: SHIPPED | OUTPATIENT
Start: 2024-08-28

## 2024-08-28 NOTE — TELEPHONE ENCOUNTER
Requested Prescriptions     Pending Prescriptions Disp Refills    topiramate 25 MG Oral Tab [Pharmacy Med Name: TOPIRAMATE 25MG TABLETS] 90 tablet 5     Sig: TAKE 1 TABLET BY MOUTH EVERY NIGHT AT BEDTIME FOR 1 WEEK, 2 TABLETS EVERY NIGHT AT BEDTIME FOR ANOTHER WEEK, THEN 3 TABLETS EVERY NIGHT AT BEDTIME     The original prescription was discontinued on 11/27/2023 by Alea Cruz MA. Renewing this prescription may not be appropriate.     Last OV: 2/28/2024  Next OV:

## 2024-08-28 NOTE — TELEPHONE ENCOUNTER
Received call from answering service that pt was having severe migraine and ran out of topiramate.  Was using it as an abortive, taking it as needed for migraine.  Sent new prescription to [facility].  Advised her to make an appt to see Dr. Garcia in the next month so that she can discuss further migraine management.  Pt verified she is not pregnant, has had tubal ligation.

## 2024-09-16 ENCOUNTER — NURSE TRIAGE (OUTPATIENT)
Dept: INTEGRATIVE MEDICINE | Facility: CLINIC | Age: 39
End: 2024-09-16

## 2024-09-16 NOTE — TELEPHONE ENCOUNTER
RN spoke with patient regarding MyChart.     Patient complains of edema in legs and recently positive for COVID. Patient tested positive two days ago.     Symptoms include congestion, cough, fever last night,       Chest pain two days ago- went to [facility] and was told to follow up with her doctor to follow up for more testing. Patient was given benzonatate for cough.     Patient has been feeling dizzy, thirsty, nauseous, and drowsy.     Patient advised to proceed to the ED/[facility]  for evaluation of symptoms.    Patient is agreeable to this plan.        Advised that message would be sent to SEAN Canas for review.           Reason for Disposition   MODERATE swelling of both ankles (e.g., swelling extends up to the knees) AND new-onset or worsening   Chest pain lasting longer than 5 minutes and occurred in last 3 days (72 hours) (Exception: Feels exactly the same as previously diagnosed heartburn and has accompanying sour taste in mouth.)    Protocols used: Leg Swelling and Edema-A-OH, Chest Pain-A-OH

## 2024-09-18 ENCOUNTER — APPOINTMENT (OUTPATIENT)
Dept: GENERAL RADIOLOGY | Age: 39
End: 2024-09-18
Attending: NURSE PRACTITIONER
Payer: MEDICAID

## 2024-09-18 ENCOUNTER — HOSPITAL ENCOUNTER (OUTPATIENT)
Age: 39
Discharge: HOME | End: 2024-09-18
Payer: MEDICAID

## 2024-09-18 VITALS
RESPIRATION RATE: 20 BRPM | HEART RATE: 78 BPM | DIASTOLIC BLOOD PRESSURE: 71 MMHG | OXYGEN SATURATION: 100 % | TEMPERATURE: 98 F | SYSTOLIC BLOOD PRESSURE: 131 MMHG

## 2024-09-18 DIAGNOSIS — R05.1 ACUTE COUGH: Primary | ICD-10-CM

## 2024-09-18 DIAGNOSIS — R30.0 DYSURIA: ICD-10-CM

## 2024-09-18 LAB
#MXD IC: 0.8 X10ˆ3/UL (ref 0.1–1)
ATRIAL RATE: 73 BPM
B-HCG UR QL: NEGATIVE
BILIRUB UR QL STRIP: NEGATIVE
BUN BLD-MCNC: 8 MG/DL (ref 7–18)
CHLORIDE BLD-SCNC: 102 MMOL/L (ref 98–112)
CO2 BLD-SCNC: 25 MMOL/L (ref 21–32)
COLOR UR: YELLOW
CREAT BLD-MCNC: 0.5 MG/DL
DDIMER WHOLE BLOOD: 329 NG/ML DDU (ref ?–400)
EGFRCR SERPLBLD CKD-EPI 2021: 121 ML/MIN/1.73M2 (ref 60–?)
GLUCOSE BLD-MCNC: 102 MG/DL (ref 70–99)
GLUCOSE BLDC GLUCOMTR-MCNC: 103 MG/DL (ref 70–99)
GLUCOSE UR STRIP-MCNC: NEGATIVE MG/DL
HCT VFR BLD AUTO: 40.4 %
HCT VFR BLD CALC: 42 %
HGB BLD-MCNC: 12.8 G/DL
ISTAT IONIZED CALCIUM FOR CHEM 8: 1.17 MMOL/L (ref 1.12–1.32)
KETONES UR STRIP-MCNC: NEGATIVE MG/DL
LYMPHOCYTES # BLD AUTO: 2.1 X10ˆ3/UL (ref 1–4)
LYMPHOCYTES NFR BLD AUTO: 22.2 %
MCH RBC QN AUTO: 29.2 PG (ref 26–34)
MCHC RBC AUTO-ENTMCNC: 31.7 G/DL (ref 31–37)
MCV RBC AUTO: 92 FL (ref 80–100)
MIXED CELL %: 8.4 %
NEUTROPHILS # BLD AUTO: 6.4 X10ˆ3/UL (ref 1.5–7.7)
NEUTROPHILS NFR BLD AUTO: 69.4 %
NITRITE UR QL STRIP: NEGATIVE
P AXIS: 44 DEGREES
P-R INTERVAL: 140 MS
PH UR STRIP: 6 [PH]
PLATELET # BLD AUTO: 317 X10ˆ3/UL (ref 150–450)
POTASSIUM BLD-SCNC: 4 MMOL/L (ref 3.6–5.1)
PROT UR STRIP-MCNC: NEGATIVE MG/DL
Q-T INTERVAL: 408 MS
QRS DURATION: 84 MS
QTC CALCULATION (BEZET): 449 MS
R AXIS: 6 DEGREES
RBC # BLD AUTO: 4.39 X10ˆ6/UL
SODIUM BLD-SCNC: 138 MMOL/L (ref 136–145)
SP GR UR STRIP: 1.02
T AXIS: 36 DEGREES
TROPONIN I BLD-MCNC: <0.02 NG/ML
UROBILINOGEN UR STRIP-ACNC: <2 MG/DL
VENTRICULAR RATE: 73 BPM
WBC # BLD AUTO: 9.3 X10ˆ3/UL (ref 4–11)

## 2024-09-18 PROCEDURE — 81025 URINE PREGNANCY TEST: CPT | Performed by: NURSE PRACTITIONER

## 2024-09-18 PROCEDURE — 81002 URINALYSIS NONAUTO W/O SCOPE: CPT | Performed by: NURSE PRACTITIONER

## 2024-09-18 PROCEDURE — 71046 X-RAY EXAM CHEST 2 VIEWS: CPT | Performed by: NURSE PRACTITIONER

## 2024-09-18 PROCEDURE — 93000 ELECTROCARDIOGRAM COMPLETE: CPT | Performed by: NURSE PRACTITIONER

## 2024-09-18 PROCEDURE — 82962 GLUCOSE BLOOD TEST: CPT | Performed by: NURSE PRACTITIONER

## 2024-09-18 PROCEDURE — 99214 OFFICE O/P EST MOD 30 MIN: CPT | Performed by: NURSE PRACTITIONER

## 2024-09-18 PROCEDURE — 80047 BASIC METABLC PNL IONIZED CA: CPT | Performed by: NURSE PRACTITIONER

## 2024-09-18 PROCEDURE — 84484 ASSAY OF TROPONIN QUANT: CPT | Performed by: NURSE PRACTITIONER

## 2024-09-18 PROCEDURE — 85025 COMPLETE CBC W/AUTO DIFF WBC: CPT | Performed by: NURSE PRACTITIONER

## 2024-09-18 RX ORDER — ALBUTEROL SULFATE 90 UG/1
4 INHALANT RESPIRATORY (INHALATION) ONCE
Status: COMPLETED | OUTPATIENT
Start: 2024-09-18 | End: 2024-09-18

## 2024-09-18 RX ORDER — ALBUTEROL SULFATE 90 UG/1
8 INHALANT RESPIRATORY (INHALATION) ONCE
Status: DISCONTINUED | OUTPATIENT
Start: 2024-09-18 | End: 2024-09-18

## 2024-09-18 RX ORDER — CODEINE PHOSPHATE AND GUAIFENESIN 10; 100 MG/5ML; MG/5ML
5 SOLUTION ORAL EVERY 6 HOURS PRN
Qty: 118 ML | Refills: 0 | Status: SHIPPED | OUTPATIENT
Start: 2024-09-18 | End: 2024-09-25

## 2024-09-18 NOTE — ED PROVIDER NOTES
Patient Seen in: [facility]      History     Chief Complaint   Patient presents with    Cough     Stated Complaint: Feet swelling; cough  Subjective:   HPI    This is a 39-year-old female with a history of hypertension, hyperlipidemia, diabetes who presents with multiple complaints.  Patient states she tested positive for COVID on Saturday.  At that time was given benzonatate which has not been helping.  He does continue to have a cough.  Reports chest congestion.  No fever.  Cough is waking her up at night.  Patient also reports intermittent bilateral leg swelling and tingling since she stopped her Trulicity.  States she had difficulty getting the medication.  Had intermittent swelling and tingling prior to starting it but it was improved when she was on it.  Patient concerned about blood glucose as she has had increased thirst, increased urination.  No CVA tenderness.    Objective:   Past Medical History:    ADHD    Essential hypertension    Heart murmur    Hyperlipidemia    Insulin resistance    Spinal stenosis    TIA (transient ischemic attack)    Urinary incontinence            Past Surgical History:   Procedure Laterality Date    Appendectomy            X2    Cholecystectomy      Evaluate only, bladder (dmg)      Midurethral sling  2022    TVT @ [facility]    Other surgical history      Gallbladder removal & 2 hernia repairs    Repair ing hernia,3+y/o,reducibl      Tubal ligation      Yr               Social History     Socioeconomic History    Marital status:    Occupational History    Occupation: [employer]   Tobacco Use    Smoking status: Never    Smokeless tobacco: Never   Vaping Use    Vaping status: Never Used   Substance and Sexual Activity    Alcohol use: Not Currently     Comment: Rarely    Drug use: Never    Sexual activity: Yes     Partners: Male     Birth control/protection: Tubal Ligation   Other Topics Concern    Caffeine Concern No    Exercise No    Seat Belt Yes     Special Diet No    Stress Concern Yes    Weight Concern Yes    Blood Transfusions No   Social History Narrative    Live with  and sons     Social Determinants of Health     Food Insecurity: Food Insecurity Present (5/14/2023)    Received from [facility], [facility]    Food Insecurity     Currently or in the past 3 months, have you worried your food would run out before you had money to buy more?: Yes     In the past 12 months, have you run out of food or been unable to get more?: No   Transportation Needs: No Transportation Needs (5/14/2023)    Received from [facility], [facility]    Transportation Needs     Medical Transportation Needs?: No    Received from [facility], [facility]    Social Connections    Received from [facility], [facility]    Housing Stability            Review of Systems   All other systems reviewed and are negative.      Positive for stated complaint: Cough    Other systems are as noted in HPI.  Constitutional and vital signs reviewed.      All other systems reviewed and negative except as noted above.    Physical Exam     ED Triage Vitals [09/18/24 1500]   /71   Pulse 78   Resp 20   Temp 97.7 °F (36.5 °C)   Temp src Temporal   SpO2 100 %   O2 Device None (Room air)     Current:/71   Pulse 78   Temp 97.7 °F (36.5 °C) (Temporal)   Resp 20   LMP 08/14/2024 (Exact Date)   SpO2 100%     Physical Exam  Vitals and nursing note reviewed.   Constitutional:       General: She is awake. She is not in acute distress.     Appearance: Normal appearance. She is not ill-appearing, toxic-appearing or diaphoretic.   HENT:      Head: Normocephalic and atraumatic.      Right Ear: Tympanic membrane, ear canal and external ear normal.      Left Ear: Tympanic membrane, ear canal and external ear normal.      Nose: Congestion present.       Mouth/Throat:      Mouth: Mucous membranes are moist.      Pharynx: Oropharynx is clear. Uvula midline.   Eyes:      General: Lids are normal.      Extraocular Movements: Extraocular movements intact.      Conjunctiva/sclera: Conjunctivae normal.      Pupils: Pupils are equal, round, and reactive to light.   Cardiovascular:      Rate and Rhythm: Normal rate and regular rhythm.      Pulses: Normal pulses.      Heart sounds: Normal heart sounds.   Pulmonary:      Effort: Pulmonary effort is normal.      Breath sounds: Normal breath sounds and air entry. No stridor, decreased air movement or transmitted upper airway sounds.   Abdominal:      General: Bowel sounds are normal.      Palpations: Abdomen is soft.      Tenderness: There is no abdominal tenderness. There is no right CVA tenderness or left CVA tenderness.   Musculoskeletal:      Right lower leg: No edema.      Left lower leg: No edema.      Comments: No lower extremity swelling, no calf swelling or erythema   Skin:     General: Skin is warm and dry.      Capillary Refill: Capillary refill takes less than 2 seconds.   Neurological:      General: No focal deficit present.      Mental Status: She is alert and oriented to person, place, and time.   Psychiatric:         Mood and Affect: Mood normal.         Behavior: Behavior normal. Behavior is cooperative.         Thought Content: Thought content normal.         Judgment: Judgment normal.         ED Course   CBC, chemistry, troponin, D-dimer and reevaluate  Labs reviewed, reassuring.  Troponin negative, D-dimer negative.  Chest x-ray viewed, no acute cardiopulmonary disease.  Albuterol inhaler administered with some improvement in symptoms  Urine reviewed, trace leukocyte esterase, small blood.  Otherwise unremarkable.  Culture pending.  XR CHEST PA + LAT CHEST (CPT=71046)    Result Date: 9/18/2024  CONCLUSION: No acute cardiopulmonary disease.    Dictated by (CST): Venkata Yoon MD on 9/18/2024 at 4:33 PM      Finalized by (CST): Venkata Yoon MD on 9/18/2024 at 4:35 PM         Labs Reviewed   POCT GLUCOSE - Abnormal; Notable for the following components:       Result Value    POC Glucose  103 (*)     All other components within normal limits   POCT ISTAT CHEM8 CARTRIDGE - Abnormal; Notable for the following components:    ISTAT Glucose 102 (*)     ISTAT Creatinine 0.50 (*)     All other components within normal limits   [facility] POCT URINALYSIS DIPSTICK - Abnormal; Notable for the following components:    Urine Clarity Slightly cloudy (*)     Blood, Urine Small (*)     Leukocyte esterase urine Trace (*)     All other components within normal limits   D-DIMER (POC) - Normal   ISTAT TROPONIN - Normal   POCT PREGNANCY URINE - Normal   POCT CBC   URINE CULTURE, ROUTINE       MDM     Medical Decision Making  Differential diagnoses reflecting the complexity of care include but are not limited to pericardial effusion, PE, DVT, pneumonia, COVID-19.    Comorbidities that add complexity to management include: Diabetes, hypertension, hyperlipidemia  History obtained by an independent source was from: N/A  Discussions of management was done with: Dr. Figueroa  My independent interpretations of studies include: CXR reviewed, personally reviewed the images which were normal.  Shared decision making was done by: Patient, Dr. Figueroa and Myself   Patient is well appearing, non-toxic and in no acute distress.  Vital signs are stable.  Labs here are reassuring, D-dimer is negative.  Her chest x-ray was reviewed and is unremarkable.  I have discussed with the patient the findings.  I have also discussed with her the importance of close follow-up with primary care provider.  Discussed with her prescription for Cheratussin which she agrees with.  Patient does not drink alcohol, I discussed with her this medication will make her sleepy so do not drive while taking it.  She does have an allergy to morphine but has tolerated Cheratussin in the  past per patient.  She is not hypoxic, not tachycardic, in no distress at discharge.  Strict ER precautions given.  Patient verbalized plan of care and states understanding.    Problems Addressed:  Acute cough: acute illness or injury    Amount and/or Complexity of Data Reviewed  Labs: ordered. Decision-making details documented in ED Course.  Radiology: ordered and independent interpretation performed. Decision-making details documented in ED Course.  ECG/medicine tests: ordered and independent interpretation performed. Decision-making details documented in ED Course.    Risk  OTC drugs.  Prescription drug management.        Disposition and Plan     Clinical Impression:  1. Acute cough    2. Dysuria         Disposition:  Discharge  9/18/2024  4:44 pm    Follow-up:  Nonstaff, Physician                Medications Prescribed:  Discharge Medication List as of 9/18/2024  5:21 PM        START taking these medications    Details   guaiFENesin-codeine 100-10 MG/5ML Oral Solution Take 5 mL by mouth every 6 (six) hours as needed for cough., Normal, Disp-118 mL, R-0                Note to patient: The 21st Century cares act makes medical notes like these available to patients in the interest of transparency.  However, be advised this medical document and is intended as peer to peer communication.  It is read the medical language and may contain abbreviations or verbiage that are unfamiliar.  It may appear blunt or direct.  Medical documents are intended to carry relevant information, fax is evident and the clinical opinion of the practitioner.    This note was prepared using Dragon Medical voice recognition dictation software.  As a result, errors may occur.  When identified, these errors have been corrected.  While every attempt is made to correct errors during dictation, discrepancies may still exist.    Francine Ortiz, DIANNE  9/18/2024  3:24 PM

## 2024-09-18 NOTE — DISCHARGE INSTRUCTIONS
Your blood work here, EKG and chest x-ray were normal.  I have sent cough medication to your pharmacy.  This cough medication will make you sleepy so do not drive while taking this medication.  Cool-mist humidifier at night.  Sleep with the head of the bed elevated.  Close follow-up with your primary care provider as recommended.  Any worsening symptoms please go to the ER.

## 2024-09-18 NOTE — ED INITIAL ASSESSMENT (HPI)
Patient reports testing positive for covid on Saturday and was given benzonatate which has not been helping. Patent states she has been fever free for 24 hours but the cough is keeping her awake at night and causing bilateral lower back pain.     Patient also reports bilateral swollen legs with tingling in both legs  but pain in calf on right side.     Patient also reporting increased thirst, increased urination, and drinking more and has not been able to get trulicity due to insurance coverage.

## 2024-09-20 ENCOUNTER — TELEMEDICINE (OUTPATIENT)
Dept: INTEGRATIVE MEDICINE | Facility: CLINIC | Age: 39
End: 2024-09-20
Payer: MEDICAID

## 2024-09-20 DIAGNOSIS — R14.0 BLOATING: ICD-10-CM

## 2024-09-20 DIAGNOSIS — M79.89 FOOT SWELLING: ICD-10-CM

## 2024-09-20 DIAGNOSIS — E88.819 INSULIN RESISTANCE: Primary | ICD-10-CM

## 2024-09-20 DIAGNOSIS — R73.09 ELEVATED GLUCOSE: ICD-10-CM

## 2024-09-20 DIAGNOSIS — L67.8 ABNORMAL FACIAL HAIR: ICD-10-CM

## 2024-09-20 NOTE — PATIENT INSTRUCTIONS
Semaglutide - sent to [facility] pharmacy     2-4 weeks to get food a habit     3 -4 meals a day no snacking   Net carbs per meal <25   Total carbs - fiber = net carbs      The following website can be utilized to purchase supplements.     [email or web address]       GI Hist Support  [employer]  G.I. Hist Support is a formula designed to assist patients who have trouble processing external sources of histamine-rich foods and nutritional elements. This formula is especially helpful for those with genetic weaknesses in A01C (Diamine Oxidase), the main enzyme responsible for the degradation of ingested histamine.*    A key active ingredient in G.I. Hist Support is porcine-derived diamine oxidase (JASMIN), and research suggests that JASMIN derived from porcine kidney appears to have identical action to JASMIN derived from porcine intestine. Along with 5 other ingredients shown to support allergy relief, GI Hist is one of the best supplements on the market that includes key cofactors along with high dose JASMIN.*    Each ingredient is clinically tested to assist with histamine degradation and the oxidative stress created by histamines.*  Adults take 1-2 capsules no more than 15 minutes before the consumption of histamine-rich foods. Children take one capsule no more than 15 minutes before the consumption of histamine-rich foods or take as directed by your healthcare practitioner.    As a daily maintenance support product, take 2 capsules in AM and 2 capsules in PM.      Serving Size: 2 Vegetable Capsules     Amount Per Serving  Vitamin C ... 200mg  (Ascorbic Acid)  Quercetin ... 100mg  Porcine Kidney ... 300mg  Alpha Lipoic Acid ... 100mg  Stinging Nettle Root 4:1 Extract ... 50mg  (Urtica dioica) (Equivalent to 200mg of Root)  Bromelain ... 100mg  (7 GDU)     Histamine food list   In general, foods to AVOID:   Cultured or fermented foods - sauerkraut, kombucha, pickles, miso, kimchee  Yeast  Vinegar and foods  containing vinegar - salad dressing, mustard, ketchup, mayonnaise  Chocolate and Cocoa  Soda and energy drinks  Canned foods  Preservatives and additives  Leftovers: very ripe, older or non-hygenic foods  Coffee, black and green tea  Spices/seasoning - anise, cinnamon, clove, nutmeg, chili powder, ibanez, hot peppers       Food group Low histamine Minimal High histamine    Protein  Poultry - chicken, duck, pheasant, turkey  -Organic, grass fed and skinless preferred  Frozen     Meat: beef, buffalo, elk, lamb, pork, venison  -Organic, grass fed preferred   frozen -Slow-cooked or leftover meat  -Processed meats: mcgowan, sausage, deli meat, etc.  -Smoked or cured: ham, salami, pastrami -Factory-farmed, or with added sugar, MSG, sulfites, or carrageenan    Seafood: sustainable fished and wild caught   -gutted within 30 minutes  Flash-frozen depending upon how quickly fish was gutted -Shellfish  -aged fish (canned, smoked)  -fish not immediately gutted after catching   Eggs Eggs organic & free-range; fully cooked  Egg whites, raw or undercooked   Dairy  Milk  Yogurt    Rice Milk  Kefir- depending on culture used     Cream      Cream Cheese (except when cultured  Aged cheese     Cottage cheese     Fruit - Fresh  Apples (all varieties) Apricots   Blackberries, Blueberries   Cherries  Dates   Figs   Exotic fruits (star fruit, quince)   Grapes (both red & green)   Melon   Nectarines   Peaches   Pears (all varieties) Plums   Pomegranates Raspberries Watermelon   Non-citrus juices Dried Fruit Generally, any overripe fruit   Avocado  Bananas   Citrus fruits   Grapefruit  Kiwi  Lemon/Lime  [place]   Oranges  Papaya  Pineapple Strawberries  Tangerines     Vegetables  Anise/fennel root Artichoke   Arugula  Asparagus   Beets  Levi peppers   Bok Xavier   Broccoli   [place] sprouts Cabbage   Carrots  Cauliflower  Celery   Cucumber   Eggplant   Garlic   Nisa   Green beans  Greens (beet, mica, mustard, turnip) Herbs (leafy: basil,  mint, parsley, oregano, rosemary, tarragon, thyme) Jicama   Kale   Kohlrabi   Leeks  Lettuce (marcelo, butter, red)   Mushrooms (all)   Okra   Onion (red)/Shallot Parsnips   Peas (snow, sugar snap)   Pumpkin   Radish   [place]   Rhubarb   Sprouts   Squash (acorn, butternut, delicata, spaghetti, summer) Sweet Potato/Yam Swiss chard Turnip Watercress   Zucchini  Spinach   Tomato (fresh or processed)   Grains  [place]  Oats  Rice  Spelt  Pasta made from corn, rice, spelt  Wheat-based foods   Breads  Yeast-free: muffins & tortillas made from acceptable grains Euro-style rye bread     Sweeteners Agave Honey Maple syrup Non-GMO sugar     Beans Garbanzo  Black  Lentils   Red beans    Fats  - must be 100% grass-fed & organic Cooking Fats:  - Animal fats   -Clarified butter   -Ghee  -Coconut oil  -Extra-virgin Olive oil Eating Fats:   - Coconut   -butter  -Coconut flakes -Olives (all) Nuts & Seeds: Almonds   [place] butter  Brazil nuts   Pecans   Pistachios   -Flax   -Pine nuts   -Pumpkin seeds/ pepitas   -Sesame seeds   -Sunflower seeds   -Sunflower seed butter   -Walnuts Cashews  Coconut milk (canned)  Hazelnuts (Filberts) Macadamia nuts  Avocado   Drinks Herbal Tea   Non citrus fruit juice  water   Green tea   Clear spirits   White wine  Coffee  Black Tea  Soda  Wine   Beer
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
## 2025-01-21 ENCOUNTER — OFFICE VISIT (OUTPATIENT)
Dept: OBGYN CLINIC | Facility: CLINIC | Age: 40
End: 2025-01-21
Payer: MEDICAID

## 2025-01-21 VITALS
HEIGHT: 63 IN | BODY MASS INDEX: 40.75 KG/M2 | DIASTOLIC BLOOD PRESSURE: 74 MMHG | WEIGHT: 230 LBS | SYSTOLIC BLOOD PRESSURE: 132 MMHG

## 2025-01-21 DIAGNOSIS — R10.2 PELVIC PAIN IN FEMALE: ICD-10-CM

## 2025-01-21 DIAGNOSIS — K42.9 UMBILICAL HERNIA WITHOUT OBSTRUCTION AND WITHOUT GANGRENE: ICD-10-CM

## 2025-01-21 DIAGNOSIS — Z12.31 ENCOUNTER FOR SCREENING MAMMOGRAM FOR MALIGNANT NEOPLASM OF BREAST: ICD-10-CM

## 2025-01-21 DIAGNOSIS — Z01.419 ENCOUNTER FOR GYNECOLOGICAL EXAMINATION WITHOUT ABNORMAL FINDING: Primary | ICD-10-CM

## 2025-01-21 PROCEDURE — 99396 PREV VISIT EST AGE 40-64: CPT | Performed by: OBSTETRICS & GYNECOLOGY

## 2025-01-21 NOTE — PROGRESS NOTES
GYN H&P     2025  5:50 PM    CC: Patient is here for annual. Pap UTD    HPI: Patient is a 39 year old  for annual    2 - 3 days prior to her period she feels like she has yeast infection in her belly button with nausea. She has a lot of drainage from her umbilicus which has a strong smell.   She had an umbilical hernia repair , and . She feels like her umbilical repair has recurred. She previously had mesh placed.     2 W ago noticed acute onset cervical pain 6 x in one day. Pain has not recurred      Menses: 1 x per month, will occasionally skip her period and may have spotting in between her period. No heavy bleeding. +c/o bloating and lower back pain both before, during and after her period. No pain with sex.     She is using TL for birth control.     Patient's last menstrual period was 2024 (exact date).    OB History    Para Term  AB Living   8 5 5   3 5   SAB IAB Ectopic Multiple Live Births   3       5      # Outcome Date GA Lbr Timothy/2nd Weight Sex Type Anes PTL Lv   8 Term 10/07/15     CS-LTranv   JYOTI   7 Term 12 38w6d   M CS-Unspec   JYOTI      Birth Comments: System Generated. Please review and update pregnancy details.   6 Term 08 39w0d 21:00 8 lb 9 oz (3.884 kg) M Vag-Spont   JYOTI   5 Term 05 38w0d 12:00 7 lb 8 oz (3.402 kg) M Vag-Spont   JYOTI      Birth Comments: PPROM @ 34 weeks   4 Term 02 37w0d 19:00 7 lb 1.5 oz (3.218 kg) M    JYOTI      Birth Comments: slight pre eclampsia  No MGSO4 at del.   3 SAB            2 SAB            1 SAB                GYN hx:    Hx Prior Abnormal Pap: No  Pap Date: 23  Pap Result Notes: WNL      Past Medical History:    ADHD    Essential hypertension    Heart murmur    Hyperlipidemia    Insulin resistance    Spinal stenosis    TIA (transient ischemic attack)    Urinary incontinence     Past Surgical History:   Procedure Laterality Date    Appendectomy            X2    Cholecystectomy      Evaluate  only, bladder (dmg)      Midurethral sling  08/12/2022    TVT @ [facility]    Other surgical history      Gallbladder removal & 2 hernia repairs    Repair ing hernia,5+y/o,reducibl      Tubal ligation      Yr 2015    Umbilical hernia repair      x 2     Allergies[1]  Family History   Problem Relation Age of Onset    Ovarian Cancer Mother 28    Hypertension Mother     Other (cervical cancer) Mother     Diabetes Father     Hypertension Father     Breast Cancer Maternal Aunt 60     Social History     Socioeconomic History    Marital status:    Occupational History    Occupation: [employer]   Tobacco Use    Smoking status: Never    Smokeless tobacco: Never   Vaping Use    Vaping status: Never Used   Substance and Sexual Activity    Alcohol use: Not Currently     Comment: Rarely    Drug use: Never    Sexual activity: Yes     Partners: Male     Birth control/protection: Tubal Ligation     Social History     Social History Narrative    Live with  and sons       Medications reviewed. See active list.     /74   Ht 63\"   Wt 230 lb (104.3 kg)   LMP 12/26/2024 (Exact Date)   BMI 40.74 kg/m²       Exam:   GENERAL: well developed, well nourished, in no apparent distress  SKIN: no rashes, no suspicious lesions  HEENT: normal  NECK: supple; no thyroidmegaly, no adenopathy  BREASTS: symmetrical, nontender, no palpable masses or nodes, no nipple discharge, no skin changes, no dippling, no palpable axillary adenopathy  ABDOMEN: Soft, non distended; non tender, no masses.  Liver and spleen non-tender, no enlargement. No palpable hernias  GYNE/:  External Genitalia: Normal appearing, no lesions, normal hair distribution   Urethral meatus appear wnl, no abnormal discharge or lesions noted.   Bladder: well supported, urethra wnl, no palpable tenderness or masses, no discharge  Vagina: normal pink mucosa, no lesions, normal clear discharge.   Uterus: midline, mobile, non-tender, firm and smooth  Cervix: pink, no lesions  grossly visible, no discharge  Adnexa: non tender, no palpable masses, normal size  Anus:  No lesions or visible hemorrhoids        A/P: Patient is 39 year old female     1. Encounter for gynecological examination without abnormal finding    2. Encounter for screening mammogram for malignant neoplasm of breast  - [facility] B[facility] 2D+3D SCREENING BILAT (CPT=77067/56619); Future    3. Umbilical hernia without obstruction and without gangrene  - Surgery Referral - In Network    4. Pelvic pain in female  - Transvaginal US GYNE Only [27126]; Future      Mari Soni MD              [1]   Allergies  Allergen Reactions    Cephalexin HIVES    Morphine NAUSEA AND VOMITING, ANGIOEDEMA and Tightness in Chest     Very anxious cannot catch breath      Sumatriptan OTHER (SEE COMMENTS)

## 2025-02-14 ENCOUNTER — HOSPITAL ENCOUNTER (OUTPATIENT)
Age: 40
Discharge: HOME | End: 2025-02-14
Payer: MEDICAID

## 2025-02-14 ENCOUNTER — APPOINTMENT (OUTPATIENT)
Dept: GENERAL RADIOLOGY | Age: 40
End: 2025-02-14
Attending: NURSE PRACTITIONER
Payer: MEDICAID

## 2025-02-14 ENCOUNTER — APPOINTMENT (OUTPATIENT)
Dept: CT IMAGING | Facility: HOSPITAL | Age: 40
End: 2025-02-14
Attending: NURSE PRACTITIONER
Payer: MEDICAID

## 2025-02-14 VITALS
OXYGEN SATURATION: 100 % | RESPIRATION RATE: 18 BRPM | HEART RATE: 74 BPM | DIASTOLIC BLOOD PRESSURE: 66 MMHG | SYSTOLIC BLOOD PRESSURE: 124 MMHG | TEMPERATURE: 99 F

## 2025-02-14 DIAGNOSIS — R06.00 DYSPNEA, UNSPECIFIED TYPE: Primary | ICD-10-CM

## 2025-02-14 LAB
#MXD IC: 0.7 X10ˆ3/UL (ref 0.1–1)
BUN BLD-MCNC: 11 MG/DL (ref 7–18)
CHLORIDE BLD-SCNC: 100 MMOL/L (ref 98–112)
CO2 BLD-SCNC: 27 MMOL/L (ref 21–32)
CREAT BLD-MCNC: 0.6 MG/DL
EGFRCR SERPLBLD CKD-EPI 2021: 115 ML/MIN/1.73M2 (ref 60–?)
GLUCOSE BLD-MCNC: 131 MG/DL (ref 70–99)
HCT VFR BLD AUTO: 39.6 %
HCT VFR BLD CALC: 41 %
HGB BLD-MCNC: 12.8 G/DL
ISTAT IONIZED CALCIUM FOR CHEM 8: 1.18 MMOL/L (ref 1.12–1.32)
LYMPHOCYTES # BLD AUTO: 2.7 X10ˆ3/UL (ref 1–4)
LYMPHOCYTES NFR BLD AUTO: 26.8 %
MCH RBC QN AUTO: 29.4 PG (ref 26–34)
MCHC RBC AUTO-ENTMCNC: 32.3 G/DL (ref 31–37)
MCV RBC AUTO: 91 FL (ref 80–100)
MIXED CELL %: 6.5 %
NEUTROPHILS # BLD AUTO: 6.7 X10ˆ3/UL (ref 1.5–7.7)
NEUTROPHILS NFR BLD AUTO: 66.7 %
PLATELET # BLD AUTO: 327 X10ˆ3/UL (ref 150–450)
POTASSIUM BLD-SCNC: 3.8 MMOL/L (ref 3.6–5.1)
RBC # BLD AUTO: 4.35 X10ˆ6/UL
SODIUM BLD-SCNC: 139 MMOL/L (ref 136–145)
TROPONIN I BLD-MCNC: <0.02 NG/ML
WBC # BLD AUTO: 10.1 X10ˆ3/UL (ref 4–11)

## 2025-02-14 PROCEDURE — 80047 BASIC METABLC PNL IONIZED CA: CPT | Performed by: NURSE PRACTITIONER

## 2025-02-14 PROCEDURE — 99214 OFFICE O/P EST MOD 30 MIN: CPT | Performed by: NURSE PRACTITIONER

## 2025-02-14 PROCEDURE — 84484 ASSAY OF TROPONIN QUANT: CPT | Performed by: NURSE PRACTITIONER

## 2025-02-14 PROCEDURE — 73060 X-RAY EXAM OF HUMERUS: CPT | Performed by: NURSE PRACTITIONER

## 2025-02-14 PROCEDURE — 93000 ELECTROCARDIOGRAM COMPLETE: CPT | Performed by: NURSE PRACTITIONER

## 2025-02-14 PROCEDURE — 71260 CT THORAX DX C+: CPT | Performed by: NURSE PRACTITIONER

## 2025-02-14 PROCEDURE — 85025 COMPLETE CBC W/AUTO DIFF WBC: CPT | Performed by: NURSE PRACTITIONER

## 2025-02-14 RX ORDER — ALBUTEROL SULFATE 90 UG/1
2 INHALANT RESPIRATORY (INHALATION) EVERY 4 HOURS PRN
Qty: 1 EACH | Refills: 0 | Status: SHIPPED | OUTPATIENT
Start: 2025-02-14 | End: 2025-03-16

## 2025-02-14 NOTE — ED INITIAL ASSESSMENT (HPI)
6-8 weeks ago had a viral infection, since then she continue to be SOB not everyday but intermittently, when she takes a deep breathe it hurts, fell twice since and hurt her L knee and r elbow/shoulder when she fell 1 week ago, pain level 7/10

## 2025-02-14 NOTE — ED PROVIDER NOTES
Patient Seen in: [facility]    History   CC: shortness of breath  HPI: Angelo Steinberg 40 year old female w/ HTN, insulin resistance, TIA, Hyperlipidemia, ADHD, heart murmur who presents c/o feeling short of breath for the last 6-8 weeks following presumed norovirus infection. States her dyspnea is exertional. +right sided lateral back pain with deep inspiration. Denies LE swelling at present. Denies recent travel or surgery. +has fallen x2 in the last 2 wks. Denies syncope. Denies abd pain, fever, rash. Denies hormonal therapy or smoking hx.     Past Medical History:    ADHD    Essential hypertension    Heart murmur    Hyperlipidemia    Insulin resistance    Spinal stenosis    TIA (transient ischemic attack)    Urinary incontinence       Past Surgical History:   Procedure Laterality Date    Appendectomy            X2    Cholecystectomy      Evaluate only, bladder (dmg)      Midurethral sling  2022    TVT @ [facility]    Other surgical history      Gallbladder removal & 2 hernia repairs    Repair ing hernia,5+y/o,reducibl      Tubal ligation      Yr     Umbilical hernia repair      x 2       Family History   Problem Relation Age of Onset    Ovarian Cancer Mother 28    Hypertension Mother     Other (cervical cancer) Mother     Diabetes Father     Hypertension Father     Breast Cancer Maternal Aunt 60       Social History     Socioeconomic History    Marital status:    Occupational History    Occupation: [employer]   Tobacco Use    Smoking status: Never     Passive exposure: Never    Smokeless tobacco: Never   Vaping Use    Vaping status: Never Used   Substance and Sexual Activity    Alcohol use: Not Currently     Comment: Rarely    Drug use: Never    Sexual activity: Yes     Partners: Male     Birth control/protection: Tubal Ligation   Other Topics Concern    Caffeine Concern No    Exercise No    Seat Belt Yes    Special Diet No    Stress Concern Yes    Weight Concern Yes    Blood  Transfusions No   Social History Narrative    Live with  and sons     Social Drivers of Health     Food Insecurity: Food Insecurity Present (5/14/2023)    [facility]    Food Insecurity     Currently or in the past 3 months, have you worried your food would run out before you had money to buy more?: Yes     In the past 12 months, have you run out of food or been unable to get more?: No   Transportation Needs: No Transportation Needs (5/14/2023)    [facility]    Transportation Needs     Currently or in the past 3 months, has lack of transportation kept you from medical appointments, getting food or medicine, or providing care to a family member?: Unrecognized value     Medical Transportation Needs?: No    [facility]    Housing Stability       ROS:  Systems reviewed: All pertinent positives noted in HPI. Unless otherwise noted, additional systems reviewed are negative.   Vital signs reviewed.    Positive for stated complaint: Fall / SOB  Other systems are as noted in HPI.  Constitutional and vital signs reviewed.      All other systems reviewed and negative except as noted above.    PSFH elements reviewed from today and agreed except as otherwise stated in HPI.             Constitutional and vital signs reviewed.        Physical Exam     ED Triage Vitals   BP 02/14/25 1647 125/67   Pulse 02/14/25 1647 81   Resp 02/14/25 1647 18   Temp 02/14/25 1647 98.5 °F (36.9 °C)   Temp src 02/14/25 1647 Oral   SpO2 02/14/25 1647 100 %   O2 Device 02/14/25 1656 None (Room air)       Current:/66   Pulse 74   Temp 98.5 °F (36.9 °C) (Oral)   Resp 18   LMP 02/03/2025 (Exact Date)   SpO2 100%         PE:  General - Appears well, non-toxic and in NAD  Head - Appears symmetrical without deformity/swelling cranium, scalp, or  facial bones  Neck - supple with trachea midline  Resp - Lung sounds clear bilaterally and wob unlabored, good aeration with equal, even expansion bilaterally   CV - RRR  Skin - no rashes or petechiae noted, pink warm and dry throughout, mmm, cap refill <2seconds  Neuro - A&O x4, sensation equal to both medial and lateral aspects of extremities, steady gait  MSK - makes purposeful movements of all extremities, radial pulses 2+ bilat.  Psych - Interactive and appropriate      ED Course     Labs Reviewed   POCT ISTAT CHEM8 CARTRIDGE - Abnormal; Notable for the following components:       Result Value    ISTAT Glucose 131 (*)     All other components within normal limits   ISTAT TROPONIN - Normal   POCT CBC     EKG    Rate, intervals and axes as noted on EKG Report.  Rate: 71  Rhythm: Sinus Rhythm  Reading: NSR           MDM     CT CHEST PE AORTA (IV ONLY) (CPT=71260)   Final Result   PROCEDURE: CT CHEST PE AORTA (IV ONLY) (CPT=71260)       COMPARISON: [facility], XR CHEST PA + LAT    CHEST (CPT=71046), 9/18/2024, 4:18 PM.       INDICATIONS: Worsening right-sided chest pain, shortness of breath x1    month.       TECHNIQUE: Multidetector CT images of the chest were obtained with    non-ionic intravenous contrast material. Automated exposure control for    dose reduction was used. Adjustment of the mA and/or kV was done based on    the patient's size. Iterative    reconstruction technique for dose reduction was employed. Multiplanar    reformats and maximum intensity projection images were created.         FINDINGS:   VASCULATURE: There is adequate opacification of the pulmonary arterial    tree. No suspicious filling defects are identified in the main, lobar,    segmental, or proximal subsegmental pulmonary artery branches to suggest    acute pulmonary embolism. The distal    subsegmental branches are less well assessed. The main pulmonary artery    trunk is normal in caliber, measuring  1.9 cm.   CARDIAC: The heart is not enlarged. There is no bowing of the    interventricular septum to suggest right ventricular strain.   THORACIC AORTA: Unremarkable configuration without aneurysm or dissection.        LUNGS/PLEURA: No airspace consolidation, pleural effusion, or pneumothorax    is detected.  There is dependent subsegmental atelectasis bilaterally.     Differential attenuation of the lung parenchyma.  Tiny 2-3 mm micronodule    in the superior segment right    lower lobe probably represents an intrapulmonary lymph node (series 4,    image 42).   AIRWAYS: The tracheobronchial tree is without central mass or obstructing    lesion.   MEDIASTINUM/NORA: No mass or lymphadenopathy.     CHEST WALL: No axillary mass or lymphadenopathy.     LIMITED ABDOMEN: Diffuse low attenuation of the imaged liver parenchyma.   BONES: No bony lesion or fracture is seen.     OTHER: Prominent but nonenlarged left level I cervical lymph node at the    edge of the field of view, which may be reactive.                   =====   CONCLUSION:    1. No evidence of acute pulmonary embolism.  Please note that overall    evaluation is limited by respiratory motion artifact and patient body    habitus.   2. Differential attenuation of the lung parenchyma suggests air trapping.     No other focal airspace disease to suggest pneumonia.   3. Fatty liver.   4. Lesser incidental findings as above.           elm-remote       Dictated by (CST): Darian Harper MD on 2/14/2025 at 7:02 PM        Finalized by (CST): Darian Harper MD on 2/14/2025 at 7:07 PM               XR HUMERUS (MIN 2 VIEWS), RIGHT (CPT=73060)   Final Result   PROCEDURE: XR HUMERUS (MIN 2 VIEWS), RIGHT (CPT=73060)       COMPARISON: None.       INDICATIONS: Fell down 2 weeks ago. Generalized right humerus pain.       TECHNIQUE: 2 views were obtained.         FINDINGS:    BONES: Normal. No significant arthropathy, fracture or acute abnormality.   SOFT TISSUES: Negative. No  visible soft tissue swelling.    EFFUSION: None visible.    OTHER: Negative.                    =====   CONCLUSION: Normal examination.                 Dictated by (CST): Skip Singh MD on 2/14/2025 at 6:33 PM        Finalized by (CST): Skip Singh MD on 2/14/2025 at 6:35 PM                   DDx: PE, ACS, arrhythmia, anemia, electrolyte imbalance    Chest x-ray as noted above with some air trapping however no PE.  Troponin negative.  CBC and chemistry unremarkable, nonfasting glucose of 131.  Patient is well-appearing with symptoms that have been ongoing for the last 6 to 8 weeks.  Discussed with patient close follow-up with PCP or the one provided as well as one of the cardiology groups as provided for additional diagnostics unavailable at the immediate care.  Strict ED precautions also reviewed.  Rest, hydration instructions, albuterol inhaler instructions and precautions on use reviewed, follow-up reviewed.  Patient is historian and demonstrates understanding of all instruction and agrees with plan of care.  This case was also discussed with Dr. Figueroa who agrees with plan of care.      Disposition and Plan     Clinical Impression:  1. Dyspnea, unspecified type        Disposition:  Discharge    Follow-up:  [facility]  [address]  [facility] [address]  [phone]  Go in 3 days      Barron Walker MD  [address]  [address]  [facility] [address]  [phone]    Go in 3 days      Ezra Pizarro, DO  [address]  [address]  [facility] [address]  [phone]    Go in 3 days        Medications Prescribed:  Discharge Medication List as of 2/14/2025  7:21 PM        START taking these medications    Details   albuterol 108 (90 Base) MCG/ACT Inhalation Aero Soln Inhale 2 puffs into the lungs every 4 (four) hours as needed for Wheezing or Shortness of Breath., Normal, Disp-1 each, R-0      Spacer/Aero-Holding Chambers Does not apply Device Spacer for use with Albuterol Inhaler, Normal,  Disp-1 each, R-0

## 2025-02-15 LAB
ATRIAL RATE: 71 BPM
P AXIS: 56 DEGREES
P-R INTERVAL: 146 MS
Q-T INTERVAL: 418 MS
QRS DURATION: 92 MS
QTC CALCULATION (BEZET): 454 MS
R AXIS: 33 DEGREES
T AXIS: 57 DEGREES
VENTRICULAR RATE: 71 BPM

## 2025-02-28 ENCOUNTER — TELEPHONE (OUTPATIENT)
Dept: OBGYN CLINIC | Facility: CLINIC | Age: 40
End: 2025-02-28

## 2025-02-28 NOTE — TELEPHONE ENCOUNTER
Mychart message sent in regards to over due Mammogram    Mychart message sent in regards to over due Mammogram

## 2025-03-13 ENCOUNTER — TELEPHONE (OUTPATIENT)
Dept: INTEGRATIVE MEDICINE | Facility: CLINIC | Age: 40
End: 2025-03-13

## 2025-03-17 ENCOUNTER — HOSPITAL ENCOUNTER (EMERGENCY)
Facility: HOSPITAL | Age: 40
Discharge: HOME | End: 2025-03-17
Payer: MEDICAID

## 2025-03-17 VITALS
SYSTOLIC BLOOD PRESSURE: 114 MMHG | HEART RATE: 63 BPM | RESPIRATION RATE: 16 BRPM | OXYGEN SATURATION: 98 % | DIASTOLIC BLOOD PRESSURE: 57 MMHG | TEMPERATURE: 98 F

## 2025-03-17 DIAGNOSIS — G43.909 ACUTE MIGRAINE: Primary | ICD-10-CM

## 2025-03-17 LAB
ANION GAP SERPL CALC-SCNC: 8 MMOL/L (ref 0–18)
B-HCG UR QL: NEGATIVE
BASOPHILS # BLD AUTO: 0.05 X10(3) UL (ref 0–0.2)
BASOPHILS NFR BLD AUTO: 0.5 %
BUN BLD-MCNC: 12 MG/DL (ref 9–23)
BUN/CREAT SERPL: 22.6 (ref 10–20)
CALCIUM BLD-MCNC: 9.2 MG/DL (ref 8.7–10.4)
CHLORIDE SERPL-SCNC: 102 MMOL/L (ref 98–112)
CO2 SERPL-SCNC: 27 MMOL/L (ref 21–32)
CREAT BLD-MCNC: 0.53 MG/DL
DEPRECATED RDW RBC AUTO: 45.1 FL (ref 35.1–46.3)
EGFRCR SERPLBLD CKD-EPI 2021: 118 ML/MIN/1.73M2 (ref 60–?)
EOSINOPHIL # BLD AUTO: 0.23 X10(3) UL (ref 0–0.7)
EOSINOPHIL NFR BLD AUTO: 2.4 %
ERYTHROCYTE [DISTWIDTH] IN BLOOD BY AUTOMATED COUNT: 13.7 % (ref 11–15)
GLUCOSE BLD-MCNC: 116 MG/DL (ref 70–99)
HCT VFR BLD AUTO: 39.3 %
HGB BLD-MCNC: 12.9 G/DL
IMM GRANULOCYTES # BLD AUTO: 0.02 X10(3) UL (ref 0–1)
IMM GRANULOCYTES NFR BLD: 0.2 %
LYMPHOCYTES # BLD AUTO: 2.83 X10(3) UL (ref 1–4)
LYMPHOCYTES NFR BLD AUTO: 29.2 %
MCH RBC QN AUTO: 29.4 PG (ref 26–34)
MCHC RBC AUTO-ENTMCNC: 32.8 G/DL (ref 31–37)
MCV RBC AUTO: 89.5 FL
MONOCYTES # BLD AUTO: 0.64 X10(3) UL (ref 0.1–1)
MONOCYTES NFR BLD AUTO: 6.6 %
NEUTROPHILS # BLD AUTO: 5.92 X10 (3) UL (ref 1.5–7.7)
NEUTROPHILS # BLD AUTO: 5.92 X10(3) UL (ref 1.5–7.7)
NEUTROPHILS NFR BLD AUTO: 61.1 %
OSMOLALITY SERPL CALC.SUM OF ELEC: 285 MOSM/KG (ref 275–295)
PLATELET # BLD AUTO: 343 10(3)UL (ref 150–450)
POTASSIUM SERPL-SCNC: 3.9 MMOL/L (ref 3.5–5.1)
RBC # BLD AUTO: 4.39 X10(6)UL
SODIUM SERPL-SCNC: 137 MMOL/L (ref 136–145)
WBC # BLD AUTO: 9.7 X10(3) UL (ref 4–11)

## 2025-03-17 PROCEDURE — 96374 THER/PROPH/DIAG INJ IV PUSH: CPT

## 2025-03-17 PROCEDURE — 81025 URINE PREGNANCY TEST: CPT

## 2025-03-17 PROCEDURE — 99284 EMERGENCY DEPT VISIT MOD MDM: CPT

## 2025-03-17 PROCEDURE — 96361 HYDRATE IV INFUSION ADD-ON: CPT

## 2025-03-17 PROCEDURE — 96375 TX/PRO/DX INJ NEW DRUG ADDON: CPT

## 2025-03-17 PROCEDURE — 80048 BASIC METABOLIC PNL TOTAL CA: CPT | Performed by: NURSE PRACTITIONER

## 2025-03-17 PROCEDURE — 85025 COMPLETE CBC W/AUTO DIFF WBC: CPT | Performed by: NURSE PRACTITIONER

## 2025-03-17 RX ORDER — KETOROLAC TROMETHAMINE 15 MG/ML
15 INJECTION, SOLUTION INTRAMUSCULAR; INTRAVENOUS ONCE
Status: COMPLETED | OUTPATIENT
Start: 2025-03-17 | End: 2025-03-17

## 2025-03-17 RX ORDER — DEXAMETHASONE SODIUM PHOSPHATE 10 MG/ML
10 INJECTION, SOLUTION INTRAMUSCULAR; INTRAVENOUS ONCE
Status: COMPLETED | OUTPATIENT
Start: 2025-03-17 | End: 2025-03-17

## 2025-03-17 RX ORDER — METHYLPREDNISOLONE 4 MG/1
TABLET ORAL
Qty: 1 EACH | Refills: 0 | Status: SHIPPED | OUTPATIENT
Start: 2025-03-17

## 2025-03-17 RX ORDER — METOCLOPRAMIDE HYDROCHLORIDE 5 MG/ML
10 INJECTION INTRAMUSCULAR; INTRAVENOUS ONCE
Status: COMPLETED | OUTPATIENT
Start: 2025-03-17 | End: 2025-03-17

## 2025-03-17 RX ORDER — DIPHENHYDRAMINE HYDROCHLORIDE 50 MG/ML
25 INJECTION, SOLUTION INTRAMUSCULAR; INTRAVENOUS ONCE
Status: COMPLETED | OUTPATIENT
Start: 2025-03-17 | End: 2025-03-17

## 2025-03-17 NOTE — ED PROVIDER NOTES
Patient Seen in: [facility] Emergency Department      History     Chief Complaint   Patient presents with    Headache     Stated Complaint: Severe Headache, pulsatile tinnitus, right arm pain    Subjective:   39yo/f w hx of ADHD, HTN, HLD, migraines reports w 10 days of migraine. Sees [facility], on topamax, was given meds at the beginning of migraine, helped a little, now worse. No neck pain. No fever. No vomiting. No vision changes. No trauma. No hx of neurosurgery. No weakness. No chest pain.               Objective:     Past Medical History:    ADHD    Essential hypertension    Heart murmur    History of Chiari malformation    Hyperlipidemia    Insulin resistance    Spinal stenosis    TIA (transient ischemic attack)    Urinary incontinence              Past Surgical History:   Procedure Laterality Date    Appendectomy            X2    Cholecystectomy      Evaluate only, bladder (dmg)      Midurethral sling  2022    TVT @ [facility]    Other surgical history      Gallbladder removal & 2 hernia repairs    Repair ing hernia,5+y/o,reducibl      Tubal ligation      Yr     Umbilical hernia repair      x 2                Social History     Socioeconomic History    Marital status:    Occupational History    Occupation: [employer]   Tobacco Use    Smoking status: Never     Passive exposure: Never    Smokeless tobacco: Never   Vaping Use    Vaping status: Never Used   Substance and Sexual Activity    Alcohol use: Not Currently     Comment: Rarely    Drug use: Never    Sexual activity: Yes     Partners: Male     Birth control/protection: Tubal Ligation   Other Topics Concern    Caffeine Concern No    Exercise No    Seat Belt Yes    Special Diet No    Stress Concern Yes    Weight Concern Yes    Blood Transfusions No   Social History Narrative    Live with  and sons     Social Drivers of Health     Food Insecurity: Food Insecurity Present (2023)    Received [facility],  [facility]    Food Insecurity     Currently or in the past 3 months, have you worried your food would run out before you had money to buy more?: Yes     In the past 12 months, have you run out of food or been unable to get more?: No   Transportation Needs: No Transportation Needs (5/14/2023)    [facility], [facility]    Transportation Needs     Currently or in the past 3 months, has lack of transportation kept you from medical appointments, getting food or medicine, or providing care to a family member?: Unrecognized value     Medical Transportation Needs?: No    Received from [facility], [facility]    Housing Stability                  Physical Exam     ED Triage Vitals [03/17/25 1727]   /77   Pulse 78   Resp 18   Temp 98 °F (36.7 °C)   Temp src    SpO2 98 %   O2 Device None (Room air)       Current Vitals:   Vital Signs  BP: 140/77  Pulse: 78  Resp: 18  Temp: 98 °F (36.7 °C)    Oxygen Therapy  SpO2: 98 %  O2 Device: None (Room air)        Physical Exam  Vitals and nursing note reviewed.   Constitutional:       General: She is not in acute distress.     Appearance: She is well-developed.   HENT:      Head: Normocephalic and atraumatic.      Nose: Nose normal.      Mouth/Throat:      Mouth: Mucous membranes are moist.   Eyes:      Conjunctiva/sclera: Conjunctivae normal.      Pupils: Pupils are equal, round, and reactive to light.   Cardiovascular:      Rate and Rhythm: Normal rate and regular rhythm.      Heart sounds: Normal heart sounds.   Pulmonary:      Effort: Pulmonary effort is normal.      Breath sounds: Normal breath sounds.   Abdominal:      General: Bowel sounds are normal.      Palpations: Abdomen is soft.   Musculoskeletal:         General: No tenderness or deformity. Normal range of motion.      Cervical back: Normal range of motion and neck supple.   Skin:     General: Skin is warm  and dry.      Capillary Refill: Capillary refill takes less than 2 seconds.      Findings: No rash.      Comments: Normal color   Neurological:      General: No focal deficit present.      Mental Status: She is alert and oriented to person, place, and time.      GCS: GCS eye subscore is 4. GCS verbal subscore is 5. GCS motor subscore is 6.      Cranial Nerves: No cranial nerve deficit.      Gait: Gait normal.             ED Course     Labs Reviewed   BASIC METABOLIC PANEL (8) - Abnormal; Notable for the following components:       Result Value    Glucose 116 (*)     Creatinine 0.53 (*)     BUN/CREA Ratio 22.6 (*)     All other components within normal limits   POCT PREGNANCY URINE - Normal   CBC WITH DIFFERENTIAL WITH PLATELET                   MDM             Medical Decision Making  39yo/f w hx and exam as stated, 10 days of migraine  Improved w meds in ed  Labs non acute  No fever  No vomiting  No chest pain  No weakness  No focal deficits  Normal neuro exam      Plan  Dc to home  Close fu with [facility]      Amount and/or Complexity of Data Reviewed  Labs:  Decision-making details documented in ED Course.    Risk  OTC drugs.  Prescription drug management.        Disposition and Plan     Clinical Impression:  1. Acute migraine         Disposition:  Discharge  3/17/2025  7:42 pm    Follow-up:  Choco Garcia MD  [address] [place] [address], [address]  [facility] [address] [phone]    Follow up in 2 day(s)            Medications Prescribed:  Current Discharge Medication List        START taking these medications    Details   methylPREDNISolone (MEDROL) 4 MG Oral Tablet Therapy Pack Dosepack: take as directed  Qty: 1 each, Refills: 0                 Supplementary Documentation:

## 2025-06-03 NOTE — ED PROVIDER NOTES
Patient Seen in: [facility] Emergency Department        History  Chief Complaint   Patient presents with    Back Pain     Stated Complaint: back pain; hx of spinal stenosis    Subjective:   HPI            40-year-old female present for evaluation of back and head pain.  She states onset of symptoms on Friday.  She describes a slow at onset mostly occipital headache, which she relates to her history of Chiari I malformation.  She states she has had this headache numerous times before.  No vomiting or visual changes no numbness weakness or tingling.  Also with low back pain constant pressure both sides of the back.  No radiation to legs.  No bowel or bladder incontinence no numbness weakness or tingling.  Taking Tylenol mostly for pain, took gabapentin, also been taking ibuprofen.       Objective:     Past Medical History:    ADHD    Essential hypertension    Heart murmur    History of Chiari malformation    Hyperlipidemia    Insulin resistance    Spinal stenosis    TIA (transient ischemic attack)    Urinary incontinence              Past Surgical History:   Procedure Laterality Date    Appendectomy            X2    Cholecystectomy      Evaluate only, bladder (dmg)      Midurethral sling  2022    TVT @ [facility]    Other surgical history      Gallbladder removal & 2 hernia repairs    Repair ing hernia,5+y/o,reducibl      Tubal ligation      Yr     Umbilical hernia repair      x 2                Social History     Socioeconomic History    Marital status:    Occupational History    Occupation: [employer]   Tobacco Use    Smoking status: Never     Passive exposure: Never    Smokeless tobacco: Never   Vaping Use    Vaping status: Never Used   Substance and Sexual Activity    Alcohol use: Not Currently     Comment: Rarely    Drug use: Never    Sexual activity: Yes     Partners: Male     Birth control/protection: Tubal Ligation   Other Topics Concern    Caffeine Concern No    Exercise No    Seat Belt  Yes    Special Diet No    Stress Concern Yes    Weight Concern Yes    Blood Transfusions No   Social History Narrative    Live with  and sons     Social Drivers of Health     Food Insecurity: Food Insecurity Present (5/14/2023)    Received from [facility]    Food Insecurity     Currently or in the past 3 months, have you worried your food would run out before you had money to buy more?: Yes     In the past 12 months, have you run out of food or been unable to get more?: No   Transportation Needs: No Transportation Needs (5/14/2023)    Received from [facility]    Transportation Needs     Currently or in the past 3 months, has lack of transportation kept you from medical appointments, getting food or medicine, or providing care to a family member?: No     Medical Transportation Needs?: No    Received from [facility]    Housing Stability                                Physical Exam    ED Triage Vitals [06/02/25 1821]   /86   Pulse 90   Resp 16   Temp 98.4 °F (36.9 °C)   Temp src Temporal   SpO2 98 %   O2 Device None (Room air)       Current Vitals:   Vital Signs  BP: 115/69  Pulse: 74  Resp: 15  Temp: 98.4 °F (36.9 °C)  Temp src: Temporal  MAP (mmHg): 84    Oxygen Therapy  SpO2: 95 %  O2 Device: None (Room air)            Physical Exam  Constitutional: awake, alert, no sig distress  HENT: mmm, no lesions,  Neck: normal range of motion, no tenderness, supple.  Eyes: PERRL, EOMI, conjunctiva normal, no discharge. Sclera anicteric.  Cardiovascular: rr no murmur  Respiratory: Normal breath sounds, no respiratory distress, no wheezing, no chest tenderness.  GI: Bowel sounds normal, Soft, no tenderness, no masses, no pulsatile masses.  : No CVA tenderness.  Skin: Warm, dry, no erythema, no rash.  Musculoskeletal: Intact distal pulses, no edema, no tenderness, no cyanosis, no clubbing. Good range of motion in all major joints. No tenderness to  palpation or major deformities noted. Back- b/l lumbar paraspinal TTP, no midline pain  Neurologic: Alert & oriented x 3, normal motor function, normal sensory function, no focal deficits noted.  Psych: Calm, cooperative, nl affect            ED Course  Labs Reviewed - No data to display    ED Course as of 06/02/25 2307  ------------------------------------------------------------  Time: 06/02 2033  Comment: Pain improved somewhat, 8/10 on re-evaluation  ------------------------------------------------------------  Time: 06/02 2306  Comment: On second reevaluation pain much improved and comfortable discharge.  Return precautions and follow-up instructions were discussed with patient who voiced understanding and agreement the plan.  All questions were answered to patient satisfaction.                       MDM     40F history as documented above presenting acute on chronic headache and back pain.  On arrival vitals are stable and reassuring  With regard to headache, low suspicion for ICH SAH or space-occupying lesion brain no full red flag symptoms no neurologic changes  With regard to patient's back pain, suspect this related to her known spinal stenosis.  She has no midline pain no step-off, no neurologic symptoms, no bowel or bladder incontinence, no indication for emergent neuroimaging of the spine.  Plan for symptomatic therapies and reassess          Medical Decision Making      Disposition and Plan     Clinical Impression:  1. Chronic nonintractable headache, unspecified headache type    2. Chronic low back pain without sciatica, unspecified back pain laterality         Disposition:  Discharge  6/2/2025 10:28 pm    Follow-up:  [facility] Emergency Department  [address] [facility] [id number] [phone]  Follow up  As needed, If symptoms worsen    Michael Neff MD  [address], [address] [id number] [phone]    Call today            Medications  Prescribed:  Current Discharge Medication List        START taking these medications    Details   traMADol 50 MG Oral Tab Take 1-2 tablets ( mg total) by mouth every 6 (six) hours as needed for Pain.  Qty: 10 tablet, Refills: 0    Associated Diagnoses: Chronic nonintractable headache, unspecified headache type; Chronic low back pain without sciatica, unspecified back pain laterality                   Supplementary Documentation:

## 2025-06-18 NOTE — ED INITIAL ASSESSMENT (HPI)
SHORTNESS OF BREATH SINCE 3 WEEKS  GOT WORSE TODAY . PATIENT HAD BLOOD TEST DONE ON LAST FRIDAY  TODAY HER PCP CALLED HER AND ADVISED TO GO TO ER BECAUSE OF ELEVATED POTASSIUM  .  DENIES ANY CHEST PAIN .TINGLING ON FINGERS AND WEAKNESS ON BOTH ARMS SINCE LAST NIGHT . PATIENT IS AMBULATORY WITH STEADY GAIT.

## 2025-06-18 NOTE — ED PROVIDER NOTES
Patient Seen in: [facility] Emergency Department        History  Chief Complaint   Patient presents with    Difficulty Breathing    Abnormal Result     Stated Complaint: SHORTNESS OF BREATH X COUPLE WEEKS, ELEVATED POTASSIUM PER PT.  SATS 99% ON ARR*    Subjective:   HPI            40-year-old female presents with abnormal outpatient potassium value.  The patient saw her primary doctor for several weeks of symptoms she has been dealing with.  Primarily concerned with whole body stiffness and soreness.  States when she wakes up in the morning her hands and particularly feel very stiff and sore but overall she has felt generally stiff and sore.  She additionally complains of some intermittent shortness of breath as well.  Intermittently has had some chest pressure discomfort.  No fevers.  No cough.  No urinary symptoms.  Some nausea, no vomiting or diarrhea.  She had labs done by her primary doctor and a potassium reported of 6.2.  She does not supplement potassium      Objective:     Past Medical History:    ADHD    Essential hypertension    Heart murmur    History of Chiari malformation    Hyperlipidemia    Insulin resistance    Spinal stenosis    TIA (transient ischemic attack)    Urinary incontinence              Past Surgical History:   Procedure Laterality Date    Appendectomy            X2    Cholecystectomy      Evaluate only, bladder (dmg)      Midurethral sling  2022    TVT @ [facility]    Other surgical history      Gallbladder removal & 2 hernia repairs    Repair ing hernia,3+y/o,reducibl      Tubal ligation      Yr 2015    Umbilical hernia repair      x 2                Social History     Socioeconomic History    Marital status:    Occupational History    Occupation: [employer]   Tobacco Use    Smoking status: Never     Passive exposure: Never    Smokeless tobacco: Never   Vaping Use    Vaping status: Never Used   Substance and Sexual Activity    Alcohol use: Not Currently     Comment:  Rarely    Drug use: Never    Sexual activity: Yes     Partners: Male     Birth control/protection: Tubal Ligation   Other Topics Concern    Caffeine Concern No    Exercise No    Seat Belt Yes    Special Diet No    Stress Concern Yes    Weight Concern Yes    Blood Transfusions No   Social History Narrative    Live with  and sons     Social Drivers of Health     Food Insecurity: Food Insecurity Present (5/14/2023)    Received from [facility]    Food Insecurity     Currently or in the past 3 months, have you worried your food would run out before you had money to buy more?: Yes     In the past 12 months, have you run out of food or been unable to get more?: No   Transportation Needs: No Transportation Needs (5/14/2023)    Received from [facility]    Transportation Needs     Currently or in the past 3 months, has lack of transportation kept you from medical appointments, getting food or medicine, or providing care to a family member?: No     Medical Transportation Needs?: No    Received from [facility]    Housing Stability                                Physical Exam    ED Triage Vitals [06/17/25 1927]   /79   Pulse 72   Resp 18   Temp 98.8 °F (37.1 °C)   Temp src Temporal   SpO2 96 %   O2 Device None (Room air)       Current Vitals:   Vital Signs  BP: 115/68  Pulse: 69  Resp: 13  Temp: 98.8 °F (37.1 °C)  Temp src: Temporal  MAP (mmHg): 81    Oxygen Therapy  SpO2: 100 %  O2 Device: None (Room air)            Physical Exam  Constitutional:       General: Is not in acute distress.     Appearance: Is not ill-appearing.   HENT:      Head: Normocephalic and atraumatic.      Mouth/Throat:      Mouth: Mucous membranes are moist.   Eyes:      Conjunctiva/sclera: Conjunctivae normal.      Pupils: Pupils are equal, round, and reactive to light.   Cardiovascular:      Rate and Rhythm: Normal rate and regular rhythm.   Pulmonary:      Effort: Pulmonary effort  is normal. No respiratory distress.      Breath sounds: Normal breath sounds.   Abdominal:      General: Abdomen is flat. There is no distension.      Tenderness: There is no abdominal tenderness.   Musculoskeletal:      Right lower leg: No edema.      Left lower leg: No edema.   Skin:     General: Skin is warm and dry.   Neurological:      General: No focal deficit present.      Mental Status: Is alert.           ED Course  Labs Reviewed   COMP METABOLIC PANEL (14) - Abnormal; Notable for the following components:       Result Value    Alkaline Phosphatase 102 (*)     All other components within normal limits   TROPONIN I HIGH SENSITIVITY - Normal   PRO BETA NATRIURETIC PEPTIDE - Normal   PROTHROMBIN TIME (PT) - Normal   PTT, ACTIVATED - Normal   POCT PREGNANCY URINE - Normal   CBC WITH DIFFERENTIAL WITH PLATELET   RAINBOW DRAW LAVENDER   RAINBOW DRAW LIGHT GREEN   RAINBOW DRAW BLUE   UA HOLD     EKG    Rate, intervals and axes as noted on EKG Report.  Rate: 68  Rhythm: Normal sinus  Reading: No ST elevation or                                MDM     Considered all emergent etiologies, differential includes but is not limited to: ZENIA, hyperkalemia, dehydration, ACS     I have independently visualized the radiology images, my focus/limited interpretation: Chest x-ray negative for pneumonia admit the     Defer to radiologist for other/incidental findings    EKG unremarkable.  Labs completely unremarkable.  Notably normal potassium.  Negative high-sensitivity troponin.  Low risk heart score.  Reviewed patient's labs that were done as an outpatient.  She had normal renal function at that time and a potassium reported at 6.2.  I suspect a hemolyzed sample as she has no clear reason to have an elevated potassium.  Discussed all results today as well as her repeat labs and likely etiology of the elevated potassium on her outpatient lab work.  Discussed further evaluation for her general stiffness and soreness as well as  her intermittent chest discomfort.  No signs of ACS and advised further workup with her primary doctor as an outpatient.  Discussed red flags and return precaution as well as differential at length        Medical Decision Making      Disposition and Plan     Clinical Impression:  1. Abnormal laboratory test         Disposition:  Discharge  6/17/2025  9:02 pm    Follow-up:  Brea Hernandez  [address] [facility] [id number] [phone]    Follow up            Medications Prescribed:  Current Discharge Medication List                Supplementary Documentation:

## (undated) NOTE — LETTER
3/13/2025              Angelo Steinberg        [address] , [address]        [facility] [address]         Dear Angelo,  ?  Our records indicate that you had an appointment on 3/04/2025 with Floresita Ray PA-C that you failed to keep.  Your healthcare is very important to us.  Please call us to reschedule your missed appointment.    If you have received this letter in error, please contact our office so that we may correct our records.      We look forward to hearing from you.    Respectfully,    Floresita Ray PA-C  [facility], [place]  [address]  [facility] [id number] [phone]

## (undated) NOTE — LETTER
Date & Time: 9/27/2023, 7:53 PM  Patient: Jun Shock  Encounter Provider(s):    DIANNE Ramirez       To Whom It May Concern:    Jun Shock was seen and treated in our department on 9/27/2023. She can return to work with these limitations: light duty .     If you have any questions or concerns, please do not hesitate to call.        _____________________________  Physician/APC Signature

## (undated) NOTE — LETTER
Date & Time: 9/18/2024, 5:15 PM  Patient: Angelo Steinberg  Encounter Provider(s):    Francine Ortiz APRN       To Whom It May Concern:    Angelo Steinberg was seen and treated in our department on 9/18/2024. She should not return to work until 9/23/24 .    If you have any questions or concerns, please do not hesitate to call.        _____________________________  Physician/APC Signature